# Patient Record
Sex: FEMALE | Race: ASIAN | NOT HISPANIC OR LATINO | Employment: FULL TIME | URBAN - METROPOLITAN AREA
[De-identification: names, ages, dates, MRNs, and addresses within clinical notes are randomized per-mention and may not be internally consistent; named-entity substitution may affect disease eponyms.]

---

## 2017-01-06 ENCOUNTER — TELEPHONE (OUTPATIENT)
Dept: PULMONOLOGY | Facility: CLINIC | Age: 47
End: 2017-01-06

## 2017-01-06 NOTE — TELEPHONE ENCOUNTER
Left vm for pt to return call to clinic in regards to med refill. Informing her she needed to make an appt since its been over a year since last seen in office.

## 2017-01-06 NOTE — TELEPHONE ENCOUNTER
----- Message from Octavia Devine sent at 1/6/2017  9:53 AM CST -----  Patient needs a refill on Montelukast 10mg called in to [facility] [phone] and the pharmacy has been faxing with no response.   Call her at [phone].                                     [email or web address]

## 2017-01-09 ENCOUNTER — TELEPHONE (OUTPATIENT)
Dept: PULMONOLOGY | Facility: CLINIC | Age: 47
End: 2017-01-09

## 2017-01-09 ENCOUNTER — OFFICE VISIT (OUTPATIENT)
Dept: PULMONOLOGY | Facility: CLINIC | Age: 47
End: 2017-01-09
Payer: COMMERCIAL

## 2017-01-09 ENCOUNTER — PROCEDURE VISIT (OUTPATIENT)
Dept: PULMONOLOGY | Facility: CLINIC | Age: 47
End: 2017-01-09
Payer: COMMERCIAL

## 2017-01-09 VITALS
RESPIRATION RATE: 18 BRPM | HEART RATE: 88 BPM | WEIGHT: 100 LBS | SYSTOLIC BLOOD PRESSURE: 94 MMHG | HEIGHT: 59 IN | OXYGEN SATURATION: 98 % | DIASTOLIC BLOOD PRESSURE: 64 MMHG | BODY MASS INDEX: 20.16 KG/M2

## 2017-01-09 DIAGNOSIS — R05.9 COUGH IN ADULT: ICD-10-CM

## 2017-01-09 DIAGNOSIS — J45.909 UNCOMPLICATED ASTHMA, UNSPECIFIED ASTHMA SEVERITY: ICD-10-CM

## 2017-01-09 DIAGNOSIS — J45.909 UNCOMPLICATED ASTHMA, UNSPECIFIED ASTHMA SEVERITY: Primary | ICD-10-CM

## 2017-01-09 LAB
PRE FEV1 FVC: 82.8 % (ref 75.77–102.92)
PRE FEV1: 1.28 L (ref 0.67–2.62)
PRE FVC: 1.54 L (ref 0.71–2.92)
PRE PEF: 2.75 L/S (ref 1.67–7.36)

## 2017-01-09 PROCEDURE — 1159F MED LIST DOCD IN RCRD: CPT | Mod: S$GLB,,, | Performed by: NURSE PRACTITIONER

## 2017-01-09 PROCEDURE — 94010 BREATHING CAPACITY TEST: CPT | Mod: S$GLB,,, | Performed by: INTERNAL MEDICINE

## 2017-01-09 PROCEDURE — 99999 PR PBB SHADOW E&M-EST. PATIENT-LVL III: CPT | Mod: PBBFAC,,, | Performed by: NURSE PRACTITIONER

## 2017-01-09 PROCEDURE — 99214 OFFICE O/P EST MOD 30 MIN: CPT | Mod: 25,S$GLB,, | Performed by: NURSE PRACTITIONER

## 2017-01-09 RX ORDER — MONTELUKAST SODIUM 10 MG/1
10 TABLET ORAL DAILY
Qty: 30 TABLET | Refills: 11 | Status: SHIPPED | OUTPATIENT
Start: 2017-01-09 | End: 2018-01-02 | Stop reason: SDUPTHER

## 2017-01-09 RX ORDER — ALBUTEROL SULFATE 90 UG/1
2 AEROSOL, METERED RESPIRATORY (INHALATION) EVERY 6 HOURS PRN
Qty: 18 G | Refills: 2 | Status: SHIPPED | OUTPATIENT
Start: 2017-01-09 | End: 2018-01-31 | Stop reason: SDUPTHER

## 2017-01-09 NOTE — PROGRESS NOTES
"Subjective:      Patient ID: Ernesto Aquino is a 46 y.o. female.    Chief Complaint: Asthma    HPI Comments: Patient presents to the office to follow for asthma.  Patient states asthma is typically controlled. No wheezing. She has not used her QVAR in over 6 months . Albuterol rarely now. Needs singulair refill for her night time cough   No fever, chills, hemoptysis. No pleuritic type chest pain.    Asthma   Her past medical history is significant for asthma.       Visit Vitals    BP 94/64    Pulse 88    Resp 18    Ht 4' 11" (1.499 m)    Wt 45.4 kg (100 lb)    LMP 12/30/2016 (Approximate)    SpO2 98%    BMI 20.2 kg/m2     Body mass index is 20.2 kg/(m^2).    Review of Systems   Constitutional: Negative.    HENT: Negative.    Respiratory: Negative.    Cardiovascular: Negative.    Musculoskeletal: Negative.    Gastrointestinal: Negative.    Neurological: Negative.    Psychiatric/Behavioral: Negative.      Objective:      Physical Exam   Constitutional: She is oriented to person, place, and time. She appears well-developed and well-nourished.   HENT:   Head: Normocephalic and atraumatic.   Mouth/Throat: Oropharynx is clear and moist.   Neck: Normal range of motion. Neck supple.   Cardiovascular: Normal rate and regular rhythm.  Exam reveals no gallop.    No murmur heard.  Pulmonary/Chest: Effort normal and breath sounds normal.   Abdominal: Soft. Bowel sounds are normal. She exhibits no mass. There is no tenderness.   Musculoskeletal: Normal range of motion. She exhibits no edema.   Neurological: She is alert and oriented to person, place, and time.   Skin: Skin is warm and dry.   Psychiatric: She has a normal mood and affect.     Personal Diagnostic Review  Review of spirometry.   Spirometry reviewed. FEV1 78 % of predicted.          Assessment:       1. Uncomplicated asthma, unspecified asthma severity        Outpatient Encounter Prescriptions as of 1/9/2017   Medication Sig Dispense Refill    albuterol " (PROAIR HFA) 90 mcg/actuation inhaler Inhale 2 puffs into the lungs every 6 (six) hours as needed for Wheezing. 18 g 2    loratadine (CLARITIN) 10 mg tablet Take 10 mg by mouth once daily.      montelukast (SINGULAIR) 10 mg tablet Take 1 tablet (10 mg total) by mouth once daily. 30 tablet 11    [DISCONTINUED] albuterol (PROAIR HFA) 90 mcg/actuation inhaler Inhale 1-2 puffs into the lungs every 6 (six) hours as needed for Wheezing.      [DISCONTINUED] montelukast (SINGULAIR) 10 mg tablet TAKE ONE TABLET BY MOUTH DAILY 30 tablet 11    [DISCONTINUED] beclomethasone (QVAR) 40 mcg/actuation Aero Inhale 2 puffs into the lungs 2 (two) times daily. 8.7 g 11     No facility-administered encounter medications on file as of 1/9/2017.      Orders Placed This Encounter   Procedures    Spirometry with/without bronchodilator     Standing Status:   Future     Standing Expiration Date:   1/9/2018     Plan:      Refill albuterol and singulair.   Follow up in 12 months with spirometry or call earlier if any problems

## 2017-02-14 ENCOUNTER — LAB VISIT (OUTPATIENT)
Dept: LAB | Facility: HOSPITAL | Age: 47
End: 2017-02-14
Attending: FAMILY MEDICINE
Payer: COMMERCIAL

## 2017-02-14 ENCOUNTER — OFFICE VISIT (OUTPATIENT)
Dept: FAMILY MEDICINE | Facility: CLINIC | Age: 47
End: 2017-02-14
Payer: COMMERCIAL

## 2017-02-14 VITALS
DIASTOLIC BLOOD PRESSURE: 70 MMHG | TEMPERATURE: 97 F | SYSTOLIC BLOOD PRESSURE: 104 MMHG | OXYGEN SATURATION: 99 % | WEIGHT: 96.13 LBS | HEIGHT: 59 IN | HEART RATE: 86 BPM | BODY MASS INDEX: 19.38 KG/M2 | RESPIRATION RATE: 18 BRPM

## 2017-02-14 DIAGNOSIS — Z00.00 ANNUAL PHYSICAL EXAM: Primary | ICD-10-CM

## 2017-02-14 DIAGNOSIS — J45.909 UNCOMPLICATED ASTHMA, UNSPECIFIED ASTHMA SEVERITY: Chronic | ICD-10-CM

## 2017-02-14 DIAGNOSIS — Z80.0 FAMILY HISTORY OF COLON CANCER: ICD-10-CM

## 2017-02-14 DIAGNOSIS — Z12.31 VISIT FOR SCREENING MAMMOGRAM: ICD-10-CM

## 2017-02-14 DIAGNOSIS — D50.9 IRON DEFICIENCY ANEMIA, UNSPECIFIED IRON DEFICIENCY ANEMIA TYPE: ICD-10-CM

## 2017-02-14 DIAGNOSIS — Z00.00 ANNUAL PHYSICAL EXAM: ICD-10-CM

## 2017-02-14 DIAGNOSIS — J30.9 ALLERGIC RHINITIS, UNSPECIFIED ALLERGIC RHINITIS TRIGGER, UNSPECIFIED RHINITIS SEASONALITY: ICD-10-CM

## 2017-02-14 LAB
AMORPH CRY UR QL COMP ASSIST: ABNORMAL
BACTERIA #/AREA URNS AUTO: ABNORMAL /HPF
BASOPHILS # BLD AUTO: 0.06 K/UL
BASOPHILS NFR BLD: 1 %
BILIRUB UR QL STRIP: NEGATIVE
CAOX CRY UR QL COMP ASSIST: ABNORMAL
CLARITY UR REFRACT.AUTO: ABNORMAL
COLOR UR AUTO: YELLOW
DIFFERENTIAL METHOD: NORMAL
EOSINOPHIL # BLD AUTO: 0.1 K/UL
EOSINOPHIL NFR BLD: 1.2 %
ERYTHROCYTE [DISTWIDTH] IN BLOOD BY AUTOMATED COUNT: 12.9 %
GLUCOSE UR QL STRIP: NEGATIVE
HCT VFR BLD AUTO: 39.5 %
HGB BLD-MCNC: 12.9 G/DL
HGB UR QL STRIP: NEGATIVE
KETONES UR QL STRIP: NEGATIVE
LEUKOCYTE ESTERASE UR QL STRIP: NEGATIVE
LYMPHOCYTES # BLD AUTO: 1.7 K/UL
LYMPHOCYTES NFR BLD: 28.5 %
MCH RBC QN AUTO: 29.3 PG
MCHC RBC AUTO-ENTMCNC: 32.7 %
MCV RBC AUTO: 90 FL
MICROSCOPIC COMMENT: ABNORMAL
MONOCYTES # BLD AUTO: 0.6 K/UL
MONOCYTES NFR BLD: 10.6 %
NEUTROPHILS # BLD AUTO: 3.5 K/UL
NEUTROPHILS NFR BLD: 58.4 %
NITRITE UR QL STRIP: NEGATIVE
PH UR STRIP: 6 [PH] (ref 5–8)
PLATELET # BLD AUTO: 266 K/UL
PMV BLD AUTO: 10.8 FL
PROT UR QL STRIP: NEGATIVE
RBC # BLD AUTO: 4.41 M/UL
RBC #/AREA URNS AUTO: 2 /HPF (ref 0–4)
SP GR UR STRIP: 1.02 (ref 1–1.03)
SQUAMOUS #/AREA URNS AUTO: 2 /HPF
URN SPEC COLLECT METH UR: ABNORMAL
UROBILINOGEN UR STRIP-ACNC: NEGATIVE EU/DL
WBC # BLD AUTO: 5.93 K/UL
WBC #/AREA URNS AUTO: 2 /HPF (ref 0–5)

## 2017-02-14 PROCEDURE — 80061 LIPID PANEL: CPT

## 2017-02-14 PROCEDURE — 84443 ASSAY THYROID STIM HORMONE: CPT

## 2017-02-14 PROCEDURE — 99396 PREV VISIT EST AGE 40-64: CPT | Mod: S$GLB,,, | Performed by: FAMILY MEDICINE

## 2017-02-14 PROCEDURE — 36415 COLL VENOUS BLD VENIPUNCTURE: CPT | Mod: PO

## 2017-02-14 PROCEDURE — 81001 URINALYSIS AUTO W/SCOPE: CPT

## 2017-02-14 PROCEDURE — 99999 PR PBB SHADOW E&M-EST. PATIENT-LVL III: CPT | Mod: PBBFAC,,, | Performed by: FAMILY MEDICINE

## 2017-02-14 PROCEDURE — 82728 ASSAY OF FERRITIN: CPT

## 2017-02-14 PROCEDURE — 85025 COMPLETE CBC W/AUTO DIFF WBC: CPT

## 2017-02-14 PROCEDURE — 80053 COMPREHEN METABOLIC PANEL: CPT

## 2017-02-14 NOTE — PROGRESS NOTES
HISTORY OF PRESENT ILLNESS: Ms. Aquino comes in today fasting and without problems for annual wellness exam. She reports no acute problems today.     END OF DECISION: She does not have a living will and is not sure about life support.     Current Outpatient Prescriptions   Medication Sig    albuterol (PROAIR HFA) 90 mcg/actuation inhaler Inhale 2 puffs into the lungs every 6 (six) hours as needed for Wheezing.    loratadine (CLARITIN) 10 mg tablet Take 10 mg by mouth once daily.    montelukast (SINGULAIR) 10 mg tablet Take 1 tablet (10 mg total) by mouth once daily.     SCREENINGS:   Cholesterol: February 23, 2016.   FFS/Colonoscopy: Never. She states she continues not to be ready for colonoscopy.  Mammogram: March 1, 2016  - okay.  GYN Exam: February 23, 2016 - okay. With Dr. Sharif, GYN, May 2013 - okay per patient. July 12, 2012 pap smear - okay.  Dexa Scan: Never.  Eye Exam: October 18, 2016 with Dr. Argelia Martin.   PPD: Negative in the past per patient.  Immunizations: Tdap - July 12, 2012.  Gardasil - N./A.  Zostavax - N./A.  Pneumovax - never. She declines.  Seasonal Flu - N./A. She declines.    ROS:  GENERAL: Denies fever, chills, fatigue or unusual weight change. Appetite normal. Weight 45.3 kg (99 lb 13.9 oz) at February 23, 2016 visit. Exercises and monitors diet.  SKIN: Denies rashes, itching, changes in mole, color or texture of skin or easy bruising.  HEAD: Denies headaches or recent head trauma.  EYES: Denies change in vision, pain, diplopia, redness or discharge.   EARS: Denies ear pain, discharge, vertigo or decreased hearing.  NOSE: Denies loss of smell, epistaxis or rhinitis.  MOUTH & THROAT: Denies hoarseness or change in voice. Denies excessive gum bleeding or mouth sores. Denies sore throat.  NODES: Denies swollen glands.  CHEST: Denies JAIMES, wheezing, cough, or sputum production. Saw NP Elizabeth Lejeune with pulmonary on January 9, 2017 for asthma surveillance with 1-year follow up  "with spirometry advised.   CARDIOVASCULAR: Denies chest pain, PND, orthopnea or reduced exercise tolerance. Denies palpitations.  ABDOMEN: Denies diarrhea, constipation, nausea, vomiting, abdominal pain, or blood in stool.  URINARY: Denies flank pain, dysuria or hematuria.  GENITOURINARY: Denies flank pain, dysuria, frequency or hematuria. No change in menses. Occasionally performs monthly breast self exams.  ENDOCRINE: Denies diabetes, thyroid, cholesterol problems.  HEME/LYMPH: Denies bleeding problems.   PERIPHERAL VASCULAR:Denies claudication or cyanosis.  MUSCULOSKELETAL: Denies joint stiffness, pain or swelling. Denies edema.  NEUROLOGIC: Denies history of seizures, tremors, paralysis, alteration of gait or coordination.  PSYCHIATRIC: Denies mood swings, depression, anxiety, homicidal or suicidal thoughts. Denies sleep problems.    PE:   VS:   Visit Vitals    /70 (BP Location: Right arm, Patient Position: Sitting, BP Method: Manual)    Pulse 86    Temp 97.3 °F (36.3 °C) (Tympanic)    Resp 18    Ht 4' 10.5" (1.486 m)    Wt 43.6 kg (96 lb 1.9 oz)    LMP 01/29/2017 (Exact Date)  Comment: Monthly    SpO2 99%    BMI 19.75 kg/m2     APPEARANCE: Well nourished, well developed female, pleasant, alert and oriented in no acute distress.   HEAD: Non tender. Full range of motion.  EYES: PERRL, conjunctiva pink, lids no edema.  EARS: External canals not patent. No swelling or redness. TM's not visualized due to impacted cerumen.   NOSE: Mucosa and turbinates pink, not swollen. No discharge. Non tender sinuses.  THROAT: No pharyngeal erythema or exudate. No stridor.   NECK: Supple, no mass, thyroid not enlarged.  NODES: No cervical, axillary or inguinal lymph node enlargement.  CHEST: Normal respiratory effort. Lungs clear to auscultation.  CARDIOVASCULAR: Normal S1, S2. No rubs, murmurs or gallops. PMI not displaced. No carotid bruit. Pedal pulses palpable bilaterally. No edema.  ABDOMEN: Bowel sounds " present. Not distended. Soft. No tenderness, masses or organomegaly.  BREAST EXAM: Symmetrical, no external lesions, no discharge, no masses palpated.  PELVIC EXAM: No external lesions noted, no discharge, cervix appears normal, bimanual exam showed no uterine abnormalities and no adnexal masses. Urethra and bladder intact.  RECTAL EXAM: Not examined.  MUSCULOSKELETAL: No joint deformities or stiffness. She is ambulatory without problems.  SKIN: No rashes or suspicious lesions, normal color and turgor.  NEUROLOGIC: Cranial Nerves: II-XII grossly intact. DTR's: Knees, Ankles 2+ and equal bilaterally. Gait & Posture: Normal gait and fine motion.  PSYCHIATRIC: Patient alert, oriented x 3. Mood/Affect normal without acute anxiety or depression noted. Judgment/insight good as she makes appropriate decisions on today's examination.       ASSESSMENT:    ICD-10-CM ICD-9-CM    1. Annual physical exam Z00.00 V70.0 CBC auto differential      TSH      Comprehensive metabolic panel      Lipid panel      Urinalysis      Ferritin   2. Uncomplicated asthma, unspecified asthma severity J45.909 493.90    3. Iron deficiency anemia, unspecified iron deficiency anemia type D50.9 280.9    4. Allergic rhinitis, unspecified allergic rhinitis trigger, unspecified rhinitis seasonality J30.9 477.9    5. Family history of colon cancer Z80.0 V16.0    6. Visit for screening mammogram Z12.31 V76.12 Mammo Digital Screening Bilat with CAD        PLAN:  1. Age-appropriate counseling-appropriate low-sodium, low-cholesterol diet and exercise daily, monthly breast self-exam, annual wellness examination.  2. Patient advised to correspond via My Chart for results.  3. Colonoscopy advised as patient has family history of colon cancer. However, she declines.   4. She also declines Pneumovax and flu shot today.   5. She declines cerumen removal.  6. Keep follow up with specialist.

## 2017-02-14 NOTE — MR AVS SNAPSHOT
[facility]  [address]  [place] [address]  Phone: [phone]                  Ernesto Aquino   2017 9:30 AM   Office Visit    Description:  Female : 1970   Provider:  Rea Kim MD   Department:  [facility]           Reason for Visit     Annual Exam           Diagnoses this Visit        Comments    Annual physical exam    -  Primary     Uncomplicated asthma, unspecified asthma severity         Iron deficiency anemia, unspecified iron deficiency anemia type         Allergic rhinitis, unspecified allergic rhinitis trigger, unspecified rhinitis seasonality         Family history of colon cancer         Visit for screening mammogram                To Do List           Future Appointments        Provider Department Dept Phone    2017 12:00 PM LABORATORY, [address] [facility]  [phone]    3/7/2017 8:15 AM [facility] MAMMO1-SCR [facility] [place] [phone]    10/24/2017 9:45 AM Argelia Martin OD [facility] - Ophthalmology [phone]      Goals (5 Years of Data)     None      Follow-Up and Disposition     Return if symptoms worsen or fail to improve.      [facility]     [facility] Nurse Care Line -  Assistance  Registered nurses in the [facility] provide clinical advisement, health education, appointment booking, and other advisory services.  Call for this free service at [phone].             Medications           Message regarding Medications     Verify the changes and/or additions to your medication regime listed below are the same as discussed with your clinician today.  If any of these changes or additions are incorrect, please notify your healthcare provider.             Verify that the below list of medications is an accurate representation of the medications you are currently taking.  If none reported, the list may be blank. If incorrect, please contact  "your healthcare provider. Carry this list with you in case of emergency.           Current Medications     albuterol (PROAIR HFA) 90 mcg/actuation inhaler Inhale 2 puffs into the lungs every 6 (six) hours as needed for Wheezing.    loratadine (CLARITIN) 10 mg tablet Take 10 mg by mouth once daily.    montelukast (SINGULAIR) 10 mg tablet Take 1 tablet (10 mg total) by mouth once daily.           Clinical Reference Information           Your Vitals Were     BP Pulse Temp Resp Height    104/70 (BP Location: Right arm, Patient Position: Sitting, BP Method: Manual) 86 97.3 °F (36.3 °C) (Tympanic) 18 4' 10.5" (1.486 m)    Weight Last Period SpO2 BMI    43.6 kg (96 lb 1.9 oz) 01/29/2017 (Exact Date) 99% 19.75 kg/m2      Blood Pressure          Most Recent Value    BP  104/70      Allergies as of 2/14/2017     No Known Allergies      Immunizations Administered on Date of Encounter - 2/14/2017     None      Orders Placed During Today's Visit      Normal Orders This Visit    Urinalysis     Future Labs/Procedures Expected by Expires    CBC auto differential  2/14/2017 4/15/2018    Comprehensive metabolic panel  2/14/2017 4/15/2018    Ferritin  2/14/2017 4/15/2018    Lipid panel  2/14/2017 4/15/2018    Mammo Digital Screening Bilat with CAD  2/14/2017 4/16/2018    TSH  2/14/2017 4/15/2018      Instructions    Please correspond with Dr. Kim via My Chart for your results.     Thanks.       Language Assistance Services     ATTENTION: Language assistance services are available, free of charge. Please call [phone].      ATENCIÓN: Si habla español, tiene a waters disposición servicios gratuitos de asistencia lingüística. Llame al [phone].     CHÚ Ý: N?u b?n nói Ti?ng Vi?t, có các d?ch v? h? tr? ngôn ng? mi?n phí dành cho b?n. G?i s? [phone].         [facility] complies with applicable Federal civil rights laws and does not discriminate on the basis of race, color, national origin, age, " disability, or sex.

## 2017-02-15 LAB
ALBUMIN SERPL BCP-MCNC: 3.8 G/DL
ALP SERPL-CCNC: 66 U/L
ALT SERPL W/O P-5'-P-CCNC: 13 U/L
ANION GAP SERPL CALC-SCNC: 9 MMOL/L
AST SERPL-CCNC: 17 U/L
BILIRUB SERPL-MCNC: 0.4 MG/DL
BUN SERPL-MCNC: 12 MG/DL
CALCIUM SERPL-MCNC: 8.9 MG/DL
CHLORIDE SERPL-SCNC: 106 MMOL/L
CHOLEST/HDLC SERPL: 2.8 {RATIO}
CO2 SERPL-SCNC: 23 MMOL/L
CREAT SERPL-MCNC: 0.6 MG/DL
EST. GFR  (AFRICAN AMERICAN): >60 ML/MIN/1.73 M^2
EST. GFR  (NON AFRICAN AMERICAN): >60 ML/MIN/1.73 M^2
FERRITIN SERPL-MCNC: 32 NG/ML
GLUCOSE SERPL-MCNC: 84 MG/DL
HDL/CHOLESTEROL RATIO: 36 %
HDLC SERPL-MCNC: 197 MG/DL
HDLC SERPL-MCNC: 71 MG/DL
LDLC SERPL CALC-MCNC: 114.6 MG/DL
NONHDLC SERPL-MCNC: 126 MG/DL
POTASSIUM SERPL-SCNC: 4.1 MMOL/L
PROT SERPL-MCNC: 8 G/DL
SODIUM SERPL-SCNC: 138 MMOL/L
TRIGL SERPL-MCNC: 57 MG/DL
TSH SERPL DL<=0.005 MIU/L-ACNC: 1.26 UIU/ML

## 2017-03-07 ENCOUNTER — HOSPITAL ENCOUNTER (OUTPATIENT)
Dept: RADIOLOGY | Facility: HOSPITAL | Age: 47
Discharge: HOME | End: 2017-03-07
Attending: FAMILY MEDICINE
Payer: COMMERCIAL

## 2017-03-07 DIAGNOSIS — Z12.31 VISIT FOR SCREENING MAMMOGRAM: ICD-10-CM

## 2017-03-07 PROCEDURE — 77067 SCR MAMMO BI INCL CAD: CPT | Mod: 26,,, | Performed by: RADIOLOGY

## 2017-03-07 PROCEDURE — 77067 SCR MAMMO BI INCL CAD: CPT | Mod: TC

## 2017-10-24 ENCOUNTER — OFFICE VISIT (OUTPATIENT)
Dept: OPHTHALMOLOGY | Facility: CLINIC | Age: 47
End: 2017-10-24
Payer: COMMERCIAL

## 2017-10-24 DIAGNOSIS — H52.13 MYOPIA OF BOTH EYES: ICD-10-CM

## 2017-10-24 DIAGNOSIS — Z83.511 FAMILY HISTORY OF GLAUCOMA: Primary | ICD-10-CM

## 2017-10-24 PROCEDURE — 92014 COMPRE OPH EXAM EST PT 1/>: CPT | Mod: S$GLB,,, | Performed by: OPTOMETRIST

## 2017-10-24 PROCEDURE — 92015 DETERMINE REFRACTIVE STATE: CPT | Mod: S$GLB,,, | Performed by: OPTOMETRIST

## 2017-10-24 PROCEDURE — 99999 PR PBB SHADOW E&M-EST. PATIENT-LVL II: CPT | Mod: PBBFAC,,, | Performed by: OPTOMETRIST

## 2017-10-24 NOTE — PROGRESS NOTES
HPI     HPI    Any vision changes since last exam: no  Eye pain: none  Other ocular symptoms: none    Do you wear currently wear glasses or contacts? Glasses    Interested in contacts today? no    Do you plan on getting new glasses today? no    Last edited by Florentino Orellana on 10/24/2017 10:05 AM. (History)            Assessment /Plan     For exam results, see Encounter Report.    Family history of glaucoma    Myopia of both eyes      Glaucoma screening and fundus exam normal.  Stable refractive error.  Updated glasses prescription.  Patient will continue with single vision distance prescription and read without glasses.  Return to clinic 1 yr.

## 2018-01-02 RX ORDER — MONTELUKAST SODIUM 10 MG/1
TABLET ORAL
Qty: 30 TABLET | Refills: 11 | Status: SHIPPED | OUTPATIENT
Start: 2018-01-02 | End: 2019-01-07 | Stop reason: SDUPTHER

## 2018-01-08 ENCOUNTER — PROCEDURE VISIT (OUTPATIENT)
Dept: PULMONOLOGY | Facility: CLINIC | Age: 48
End: 2018-01-08
Payer: COMMERCIAL

## 2018-01-08 ENCOUNTER — OFFICE VISIT (OUTPATIENT)
Dept: PULMONOLOGY | Facility: CLINIC | Age: 48
End: 2018-01-08
Payer: COMMERCIAL

## 2018-01-08 VITALS
RESPIRATION RATE: 16 BRPM | WEIGHT: 87 LBS | SYSTOLIC BLOOD PRESSURE: 100 MMHG | OXYGEN SATURATION: 99 % | DIASTOLIC BLOOD PRESSURE: 60 MMHG | BODY MASS INDEX: 17.54 KG/M2 | HEIGHT: 59 IN

## 2018-01-08 DIAGNOSIS — J45.909 UNCOMPLICATED ASTHMA: ICD-10-CM

## 2018-01-08 DIAGNOSIS — R05.9 COUGH IN ADULT: ICD-10-CM

## 2018-01-08 DIAGNOSIS — J45.20 MILD INTERMITTENT ASTHMA WITHOUT COMPLICATION: Primary | ICD-10-CM

## 2018-01-08 PROCEDURE — 99999 PR PBB SHADOW E&M-EST. PATIENT-LVL III: CPT | Mod: PBBFAC,,, | Performed by: NURSE PRACTITIONER

## 2018-01-08 PROCEDURE — 99214 OFFICE O/P EST MOD 30 MIN: CPT | Mod: 25,S$GLB,, | Performed by: NURSE PRACTITIONER

## 2018-01-08 PROCEDURE — 94060 EVALUATION OF WHEEZING: CPT | Mod: S$GLB,,, | Performed by: INTERNAL MEDICINE

## 2018-01-08 NOTE — PROGRESS NOTES
"Subjective:      Patient ID: Ernesto Aquino is a 47 y.o. female.    Chief Complaint: Asthma    Patient presents to the office today for evaluation of asthma.  This is annual follow-up.  Her asthma was controlled with Singulair alone until recently.  Over the last month increased asthma symptoms and increased use of rescue inhaler.  Increased chest tightness and shortness of breath.  No fever, chills, hemoptysis        /60   Resp 16   Ht 4' 11" (1.499 m)   Wt 39.5 kg (87 lb)   SpO2 99%   BMI 17.57 kg/m²   Body mass index is 17.57 kg/m².    Review of Systems   Constitutional: Negative.    HENT: Negative.    Respiratory: Positive for shortness of breath and wheezing.    Cardiovascular: Negative.    Musculoskeletal: Negative.    Gastrointestinal: Negative.    Neurological: Negative.    Psychiatric/Behavioral: Negative.      Objective:      Physical Exam   Constitutional: She is oriented to person, place, and time. She appears well-developed and well-nourished.   HENT:   Head: Normocephalic and atraumatic.   Mouth/Throat: Oropharynx is clear and moist.   Neck: Normal range of motion. Neck supple.   Cardiovascular: Normal rate and regular rhythm.  Exam reveals no gallop.    No murmur heard.  Pulmonary/Chest: Effort normal and breath sounds normal.   Abdominal: Soft. She exhibits no mass. There is no tenderness.   Musculoskeletal: Normal range of motion. She exhibits no edema.   Neurological: She is alert and oriented to person, place, and time.   Skin: Skin is warm and dry.   Psychiatric: She has a normal mood and affect.     Personal Diagnostic Review  Spirometry reviewed. FEV1 78 % of predicted.          Assessment:       1. Mild intermittent asthma without complication    2. Cough in adult        Outpatient Encounter Prescriptions as of 1/8/2018   Medication Sig Dispense Refill    albuterol (PROAIR HFA) 90 mcg/actuation inhaler Inhale 2 puffs into the lungs every 6 (six) hours as needed for Wheezing. 18 g " 2    loratadine (CLARITIN) 10 mg tablet Take 10 mg by mouth once daily.      montelukast (SINGULAIR) 10 mg tablet TAKE 1 TABLET BY MOUTH ONCE DAILY 30 tablet 11    beclomethasone (QVAR) 40 mcg/actuation Aero Inhale 2 puffs into the lungs 2 (two) times daily. Controller 120 each 11     No facility-administered encounter medications on file as of 1/8/2018.      No orders of the defined types were placed in this encounter.    Plan:   Add QVAR.  Continue Singulair and albuterol.  Call if symptoms do not resolve

## 2018-01-09 LAB
POST FEF 25 75: 2.81 L/S (ref 2.15–3.2)
POST FET 100: 7.34 S
POST FEV1 FVC: 87 %
POST FEV1: 1.93 L (ref 2.22–2.71)
POST FIF 50: 3.67 L/S
POST FVC: 2.21 L (ref 2.75–3.34)
POST PEF: 3.98 L/S (ref 5.42–6.88)
PRE FEF 25 75: 2.73 L/S (ref 2.15–3.2)
PRE FET 100: 7.42 S
PRE FEV1 FVC: 89 %
PRE FEV1: 1.81 L (ref 2.22–2.71)
PRE FIF 50: 2.56 L/S
PRE FVC: 2.04 L (ref 2.75–3.34)
PRE PEF: 3.85 L/S (ref 5.42–6.88)
PREDICTED FEV1 FVC: 81.25 % (ref 76.35–86.14)
PREDICTED FEV1: 2.46 L (ref 2.22–2.71)
PREDICTED FVC: 3.05 L (ref 2.75–3.34)
PROVOCATION PROTOCOL: ABNORMAL

## 2018-01-31 RX ORDER — ALBUTEROL SULFATE 90 UG/1
AEROSOL, METERED RESPIRATORY (INHALATION)
Qty: 8.5 G | Refills: 0 | Status: SHIPPED | OUTPATIENT
Start: 2018-01-31 | End: 2018-03-03 | Stop reason: SDUPTHER

## 2018-02-07 ENCOUNTER — TELEPHONE (OUTPATIENT)
Dept: PULMONOLOGY | Facility: CLINIC | Age: 48
End: 2018-02-07

## 2018-02-27 ENCOUNTER — LAB VISIT (OUTPATIENT)
Dept: LAB | Facility: HOSPITAL | Age: 48
End: 2018-02-27
Attending: FAMILY MEDICINE
Payer: COMMERCIAL

## 2018-02-27 ENCOUNTER — OFFICE VISIT (OUTPATIENT)
Dept: FAMILY MEDICINE | Facility: CLINIC | Age: 48
End: 2018-02-27
Payer: COMMERCIAL

## 2018-02-27 VITALS
OXYGEN SATURATION: 97 % | BODY MASS INDEX: 16.8 KG/M2 | HEIGHT: 59 IN | SYSTOLIC BLOOD PRESSURE: 100 MMHG | DIASTOLIC BLOOD PRESSURE: 70 MMHG | HEART RATE: 86 BPM | TEMPERATURE: 98 F | RESPIRATION RATE: 18 BRPM | WEIGHT: 83.31 LBS

## 2018-02-27 DIAGNOSIS — D50.9 IRON DEFICIENCY ANEMIA, UNSPECIFIED IRON DEFICIENCY ANEMIA TYPE: ICD-10-CM

## 2018-02-27 DIAGNOSIS — J45.909 UNCOMPLICATED ASTHMA, UNSPECIFIED ASTHMA SEVERITY, UNSPECIFIED WHETHER PERSISTENT: Chronic | ICD-10-CM

## 2018-02-27 DIAGNOSIS — Z00.00 ANNUAL PHYSICAL EXAM: ICD-10-CM

## 2018-02-27 DIAGNOSIS — Z80.0 FAMILY HISTORY OF COLON CANCER: ICD-10-CM

## 2018-02-27 LAB
ALBUMIN SERPL BCP-MCNC: 3.7 G/DL
ALP SERPL-CCNC: 62 U/L
ALT SERPL W/O P-5'-P-CCNC: 12 U/L
ANION GAP SERPL CALC-SCNC: 8 MMOL/L
AST SERPL-CCNC: 14 U/L
BASOPHILS # BLD AUTO: 0.06 K/UL
BASOPHILS NFR BLD: 1 %
BILIRUB SERPL-MCNC: 0.5 MG/DL
BILIRUB UR QL STRIP: NEGATIVE
BUN SERPL-MCNC: 9 MG/DL
CALCIUM SERPL-MCNC: 8.9 MG/DL
CHLORIDE SERPL-SCNC: 106 MMOL/L
CHOLEST SERPL-MCNC: 165 MG/DL
CHOLEST/HDLC SERPL: 2.6 {RATIO}
CLARITY UR REFRACT.AUTO: CLEAR
CO2 SERPL-SCNC: 26 MMOL/L
COLOR UR AUTO: YELLOW
CREAT SERPL-MCNC: 0.6 MG/DL
DIFFERENTIAL METHOD: ABNORMAL
EOSINOPHIL # BLD AUTO: 0.1 K/UL
EOSINOPHIL NFR BLD: 0.8 %
ERYTHROCYTE [DISTWIDTH] IN BLOOD BY AUTOMATED COUNT: 13.8 %
EST. GFR  (AFRICAN AMERICAN): >60 ML/MIN/1.73 M^2
EST. GFR  (NON AFRICAN AMERICAN): >60 ML/MIN/1.73 M^2
FERRITIN SERPL-MCNC: 18 NG/ML
GLUCOSE SERPL-MCNC: 78 MG/DL
GLUCOSE UR QL STRIP: NEGATIVE
HCT VFR BLD AUTO: 38.3 %
HDLC SERPL-MCNC: 63 MG/DL
HDLC SERPL: 38.2 %
HGB BLD-MCNC: 12 G/DL
HGB UR QL STRIP: NEGATIVE
IMM GRANULOCYTES # BLD AUTO: 0.02 K/UL
IMM GRANULOCYTES NFR BLD AUTO: 0.3 %
KETONES UR QL STRIP: NEGATIVE
LDLC SERPL CALC-MCNC: 94 MG/DL
LEUKOCYTE ESTERASE UR QL STRIP: NEGATIVE
LYMPHOCYTES # BLD AUTO: 1.6 K/UL
LYMPHOCYTES NFR BLD: 27.1 %
MCH RBC QN AUTO: 28.2 PG
MCHC RBC AUTO-ENTMCNC: 31.3 G/DL
MCV RBC AUTO: 90 FL
MONOCYTES # BLD AUTO: 0.6 K/UL
MONOCYTES NFR BLD: 9.6 %
NEUTROPHILS # BLD AUTO: 3.7 K/UL
NEUTROPHILS NFR BLD: 61.2 %
NITRITE UR QL STRIP: NEGATIVE
NONHDLC SERPL-MCNC: 102 MG/DL
NRBC BLD-RTO: 0 /100 WBC
PH UR STRIP: 6 [PH] (ref 5–8)
PLATELET # BLD AUTO: 249 K/UL
PMV BLD AUTO: 11.8 FL
POTASSIUM SERPL-SCNC: 3.9 MMOL/L
PROT SERPL-MCNC: 7.8 G/DL
PROT UR QL STRIP: NEGATIVE
RBC # BLD AUTO: 4.26 M/UL
SODIUM SERPL-SCNC: 140 MMOL/L
SP GR UR STRIP: 1.02 (ref 1–1.03)
TRIGL SERPL-MCNC: 40 MG/DL
TSH SERPL DL<=0.005 MIU/L-ACNC: 1.01 UIU/ML
URN SPEC COLLECT METH UR: ABNORMAL
UROBILINOGEN UR STRIP-ACNC: ABNORMAL EU/DL
WBC # BLD AUTO: 6.05 K/UL

## 2018-02-27 PROCEDURE — 99396 PREV VISIT EST AGE 40-64: CPT | Mod: S$GLB,,, | Performed by: FAMILY MEDICINE

## 2018-02-27 PROCEDURE — 80053 COMPREHEN METABOLIC PANEL: CPT

## 2018-02-27 PROCEDURE — 81003 URINALYSIS AUTO W/O SCOPE: CPT

## 2018-02-27 PROCEDURE — 80061 LIPID PANEL: CPT

## 2018-02-27 PROCEDURE — 85025 COMPLETE CBC W/AUTO DIFF WBC: CPT

## 2018-02-27 PROCEDURE — 36415 COLL VENOUS BLD VENIPUNCTURE: CPT | Mod: PO

## 2018-02-27 PROCEDURE — 82728 ASSAY OF FERRITIN: CPT

## 2018-02-27 PROCEDURE — 84443 ASSAY THYROID STIM HORMONE: CPT

## 2018-02-27 PROCEDURE — 99999 PR PBB SHADOW E&M-EST. PATIENT-LVL III: CPT | Mod: PBBFAC,,, | Performed by: FAMILY MEDICINE

## 2018-02-27 NOTE — PROGRESS NOTES
HISTORY OF PRESENT ILLNESS: Ms. Aquino comes in today fasting and without problems for annual wellness exam. She reports no acute problems today.     END OF DECISION: She does not have a living will and is not sure about life support.     Current Outpatient Prescriptions   Medication Sig    beclomethasone dipropionate (QVAR REDIHALER) 40 mcg/actuation HFAB Inhale 2 puffs into the lungs 2 (two) times daily.    loratadine (CLARITIN) 10 mg tablet Take 10 mg by mouth once daily.    montelukast (SINGULAIR) 10 mg tablet TAKE 1 TABLET BY MOUTH ONCE DAILY    PROAIR HFA 90 mcg/actuation inhaler INHALE 2 PUFFS BY MOUTH INTO THE LUNGS EVERY 6 HOURS AS NEEDED FOR WHEEZING     SCREENINGS:   Cholesterol: February 14, 2017.   FFS/Colonoscopy: Never. She states she continues not to be ready for colonoscopy and declines FIT-Kit.  Mammogram: March 7, 2017 - okay. She desires to skip this year.  GYN Exam: February 23, 2016 pap smear - okay. Pap smear due 2019.   Dexa Scan: Never.  Eye Exam: October 24, 2017 with Dr. Argelia Martin. She wears glasses.  PPD: Negative in the past per patient.  Immunizations: Tdap - July 12, 2012.  Gardasil - N./A.  Zostavax - N./A.  Pneumovax - never. She declines.  Seasonal Flu - N./A. She declines.    ROS:  GENERAL: Denies fever, chills, fatigue or unusual weight change. Appetite normal. Weight 43.6 kg (96 lb 1.9 oz) at February 14, 2017 visit. Exercises and monitors diet.  SKIN: Denies rashes, itching, changes in mole, color or texture of skin or easy bruising.  HEAD: Denies headaches or recent head trauma.  EYES: Denies change in vision, pain, diplopia, redness or discharge.   EARS: Denies ear pain, discharge, vertigo or decreased hearing.  NOSE: Denies loss of smell, epistaxis or rhinitis.  MOUTH & THROAT: Denies hoarseness or change in voice. Denies excessive gum bleeding or mouth sores. Denies sore throat.  NODES: Denies swollen glands.  CHEST: Denies JAIMES, wheezing, cough, or sputum  "production. Saw NP Elizabeth Lejeune with pulmonary on January 8, 2018 for asthma surveillance with Qvar added to Albuterol, Singulair with 1-year follow up with spirometry advised.   CARDIOVASCULAR: Denies chest pain, PND, orthopnea or reduced exercise tolerance. Denies palpitations.  ABDOMEN: Denies diarrhea, constipation, nausea, vomiting, abdominal pain, or blood in stool.  URINARY: Denies flank pain, dysuria or hematuria.  GENITOURINARY: Denies flank pain, dysuria, frequency or hematuria. No change in menses. Occasionally performs monthly breast self exams. Interested in conception.  ENDOCRINE: Denies diabetes, thyroid, cholesterol problems.  HEME/LYMPH: Denies bleeding problems.   PERIPHERAL VASCULAR:Denies claudication or cyanosis.  MUSCULOSKELETAL: Denies joint stiffness, pain or swelling. Denies edema.  NEUROLOGIC: Denies history of seizures, tremors, paralysis, alteration of gait or coordination.  PSYCHIATRIC: Denies mood swings, depression, anxiety, homicidal or suicidal thoughts. Denies sleep problems.    PE:   VS: /70 (BP Location: Right arm, Patient Position: Sitting, BP Method: Medium (Manual))   Pulse 86   Temp 98.4 °F (36.9 °C) (Oral)   Resp 18   Ht 4' 11" (1.499 m)   Wt 37.8 kg (83 lb 5.3 oz)   LMP 02/21/2018 (Exact Date) Comment: Monthly  SpO2 97%   BMI 16.83 kg/m²   APPEARANCE: Well nourished, well developed female, pleasant, alert and oriented in no acute distress.   HEAD: Non tender. Full range of motion.  EYES: PERRL, conjunctiva pink, lids no edema.  EARS: External canals not patent. No swelling or redness. TM's not visualized due to impacted cerumen.   NOSE: Mucosa and turbinates pink, not swollen. No discharge. Non tender sinuses.  THROAT: No pharyngeal erythema or exudate. No stridor.   NECK: Supple, no mass, thyroid not enlarged.  NODES: No cervical, axillary or inguinal lymph node enlargement.  CHEST: Normal respiratory effort. Lungs clear to auscultation.  CARDIOVASCULAR: " Normal S1, S2. No rubs, murmurs or gallops. PMI not displaced. No carotid bruit. Pedal pulses palpable bilaterally. No edema.  ABDOMEN: Bowel sounds present. Not distended. Soft. No tenderness, masses or organomegaly.  BREAST EXAM: Symmetrical, no external lesions, no discharge, no masses palpated.  PELVIC EXAM: No external lesions noted, no discharge, cervix appears normal, bimanual exam showed no uterine abnormalities and no adnexal masses. Urethra and bladder intact.  RECTAL EXAM: Not examined.  MUSCULOSKELETAL: No joint deformities or stiffness. She is ambulatory without problems.  SKIN: No rashes or suspicious lesions, normal color and turgor.  NEUROLOGIC: Cranial Nerves: II-XII grossly intact. DTR's: Knees, Ankles 2+ and equal bilaterally. Gait & Posture: Normal gait and fine motion.  PSYCHIATRIC: Patient alert, oriented x 3. Mood/Affect normal without acute anxiety or depression noted. Judgment/insight good as she makes appropriate decisions on today's examination.     ASSESSMENT:    ICD-10-CM ICD-9-CM    1. Annual physical exam Z00.00 V70.0 CBC auto differential      TSH      Urinalysis      Comprehensive metabolic panel      Lipid panel      Ferritin   2. Uncomplicated asthma, unspecified asthma severity, unspecified whether persistent J45.909 493.90    3. Iron deficiency anemia, unspecified iron deficiency anemia type D50.9 280.9    4. Family history of colon cancer Z80.0 V16.0      PLAN:  1. Age-appropriate counseling-appropriate low-sodium, low-cholesterol, low carbohydrate diet and exercise daily, monthly breast self exam, annual wellness examination.   2. Patient advised to correspond via My Chart for results.  3. Colonoscopy advised as patient has family history of colon cancer. However, she declines.   4. She also declines Pneumovax and flu shot today.   5. She declines cerumen removal.  6. Keep follow up with specialists.   7. Follow-up in about 1 year (around 2/26/2019) for physical.

## 2018-03-05 RX ORDER — ALBUTEROL SULFATE 90 UG/1
AEROSOL, METERED RESPIRATORY (INHALATION)
Qty: 8.5 G | Refills: 0 | Status: SHIPPED | OUTPATIENT
Start: 2018-03-05 | End: 2019-01-04 | Stop reason: SDUPTHER

## 2018-03-22 ENCOUNTER — OFFICE VISIT (OUTPATIENT)
Dept: OBSTETRICS AND GYNECOLOGY | Facility: CLINIC | Age: 48
End: 2018-03-22
Payer: COMMERCIAL

## 2018-03-22 VITALS
HEIGHT: 59 IN | DIASTOLIC BLOOD PRESSURE: 71 MMHG | BODY MASS INDEX: 16.44 KG/M2 | SYSTOLIC BLOOD PRESSURE: 118 MMHG | WEIGHT: 81.56 LBS

## 2018-03-22 DIAGNOSIS — Z01.419 ENCOUNTER FOR GYNECOLOGICAL EXAMINATION WITHOUT ABNORMAL FINDING: Primary | ICD-10-CM

## 2018-03-22 PROCEDURE — 99999 PR PBB SHADOW E&M-EST. PATIENT-LVL III: CPT | Mod: PBBFAC,,, | Performed by: NURSE PRACTITIONER

## 2018-03-22 PROCEDURE — 99386 PREV VISIT NEW AGE 40-64: CPT | Mod: S$GLB,,, | Performed by: NURSE PRACTITIONER

## 2018-03-22 NOTE — PROGRESS NOTES
"CC: Well woman exam    Ernesto Aquino is a 47 y.o. female  presents for well woman exam.  LMP: Patient's last menstrual period was 2018..  Pap with Dr. Kim in  negative.   Pt wants pregnancy.  Cycle started today, Last was 18 so pt requested upt - it was negative.  She realize risk with her age and pregnancy.  She has been actively trying with no success.       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     Family History   Problem Relation Age of Onset    Diabetes Mother     Hypertension Mother     Cancer Mother      Lung cancer (nonsmoker)    Stroke Mother     Diabetes Father     Heart disease Father      CABG x 4    Hypertension Father     Cataracts Father     Cancer Brother 49     Colon cancer    Hypertension Sister     Asthma Sister     Asthma Brother     Asthma Brother     Glaucoma Paternal Grandmother      OB History      Para Term  AB Living    0              SAB TAB Ectopic Multiple Live Births                       /71   Ht 4' 11" (1.499 m)   Wt 37 kg (81 lb 9.1 oz)   LMP 2018 Comment: Monthly  BMI 16.48 kg/m²       ROS:  GENERAL: Denies weight gain or weight loss. Feeling well overall.   SKIN: Denies rash or lesions.   HEAD: Denies head " injury or headache.   NODES: Denies enlarged lymph nodes.   CHEST: Denies chest pain or shortness of breath.   CARDIOVASCULAR: Denies palpitations or left sided chest pain.   ABDOMEN: No abdominal pain, constipation, diarrhea, nausea, vomiting or rectal bleeding.   URINARY: No frequency, dysuria, hematuria, or burning on urination.  REPRODUCTIVE: See HPI.   BREASTS: The patient performs breast self-examination and denies pain, lumps, or nipple discharge.   HEMATOLOGIC: No easy bruisability or excessive bleeding.   MUSCULOSKELETAL: Denies joint pain or swelling.   NEUROLOGIC: Denies syncope or weakness.   PSYCHIATRIC: Denies depression, anxiety or mood swings.    PHYSICAL EXAM:  APPEARANCE: Well nourished, well developed, in no acute distress.  AFFECT: WNL, alert and oriented x 3  SKIN: No acne or hirsutism  NECK: Neck symmetric without masses or thyromegaly  NODES: No inguinal, cervical, axillary, or femoral lymph node enlargement  CHEST: Good respiratory effect  ABDOMEN: Soft.  No tenderness or masses.  No hepatosplenomegaly.  No hernias.  BREASTS: Symmetrical, no skin changes or visible lesions.  No palpable masses, nipple discharge bilaterally.  PELVIC: Normal external genitalia without lesions.  Normal hair distribution.  Adequate perineal body, normal urethral meatus.  Vagina moist and well rugated without lesions or discharge.  Cervix pink, without lesions, discharge or tenderness.  No significant cystocele or rectocele.  Bimanual exam shows uterus to be normal size, regular, mobile and nontender.  Adnexa without masses or tenderness.    EXTREMITIES: No edema.  Physical Exam    1. Encounter for gynecological examination without abnormal finding  POCT Urine Pregnancy    AND PLAN:    Patient was counseled today on A.C.S. Pap guidelines and recommendations for yearly pelvic exams, mammograms and monthly self breast exams; to see her PCP for other health maintenance.     Declines mmg this year  upt negative today  in office   recommend start prenatal vitamin  Will see gyn MD back to discuss fertility at age 47

## 2018-04-09 ENCOUNTER — OFFICE VISIT (OUTPATIENT)
Dept: OBSTETRICS AND GYNECOLOGY | Facility: CLINIC | Age: 48
End: 2018-04-09
Payer: COMMERCIAL

## 2018-04-09 VITALS
DIASTOLIC BLOOD PRESSURE: 68 MMHG | SYSTOLIC BLOOD PRESSURE: 102 MMHG | HEIGHT: 60 IN | WEIGHT: 81 LBS | BODY MASS INDEX: 15.9 KG/M2

## 2018-04-09 DIAGNOSIS — Z31.69 ENCOUNTER FOR PRECONCEPTION CONSULTATION: Primary | ICD-10-CM

## 2018-04-09 PROCEDURE — 99999 PR PBB SHADOW E&M-EST. PATIENT-LVL II: CPT | Mod: PBBFAC,,, | Performed by: OBSTETRICS & GYNECOLOGY

## 2018-04-09 PROCEDURE — 99213 OFFICE O/P EST LOW 20 MIN: CPT | Mod: S$GLB,,, | Performed by: OBSTETRICS & GYNECOLOGY

## 2018-04-09 NOTE — PROGRESS NOTES
CHIEF COMPLAINT:   Chief Complaint   Patient presents with    Infertility       HISTORY OF PRESENT ILLNESS    Ernesto Aquino 47 y.o. G0  Wants to get pregnant. Never had a pregnancy. She just had intercourse for the first time this year w current partner. She and her partner decided they want to try for a baby. He has a an 10yo and 7yo children.   She came for advise. She is not interested in IRVIN or IVF.       Menarche about 13yo, now menses reg qmo, not too heavy, max 4d. No dysmennorhea.  Patient's last menstrual period was 03/22/2018.     Pap 2016 nl   .    HISTORY  Patient Active Problem List   Diagnosis    Asthma    Family history of colon cancer    QUINTEN (iron deficiency anemia)       Past Medical History:   Diagnosis Date    Allergic rhinitis, cause unspecified     Asthma     QUINTEN (iron deficiency anemia) 3/2015       Past Surgical History:   Procedure Laterality Date    None         Family History   Problem Relation Age of Onset    Diabetes Mother     Hypertension Mother     Cancer Mother      Lung cancer (nonsmoker)    Stroke Mother     Diabetes Father     Heart disease Father      CABG x 4    Hypertension Father     Cataracts Father     Cancer Brother 49     Colon cancer    Hypertension Sister     Asthma Sister     Asthma Brother     Asthma Brother     Glaucoma Paternal Grandmother        Social History     Social History    Marital status: Single     Spouse name: N/A    Number of children: 0    Years of education: N/A     Occupational History    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       Current Outpatient Prescriptions   Medication Sig  Dispense Refill    beclomethasone dipropionate (QVAR REDIHALER) 40 mcg/actuation HFAB Inhale 2 puffs into the lungs 2 (two) times daily. 10.6 g 11    loratadine (CLARITIN) 10 mg tablet Take 10 mg by mouth once daily.      montelukast (SINGULAIR) 10 mg tablet TAKE 1 TABLET BY MOUTH ONCE DAILY 30 tablet 11    PROAIR HFA 90 mcg/actuation inhaler INHALE 2 PUFFS BY MOUTH INTO THE LUNGS EVERY 6 HOURS AS NEEDED FOR WHEEZING 8.5 g 0     No current facility-administered medications for this visit.        Review of patient's allergies indicates:  No Known Allergies        PHYSICAL EXAM     Vitals:    04/09/18 1132   BP: 102/68       PAIN SCALE: 0/10 None    ROS:  GENERAL: No fever, chills, fatigability or weight loss.  ABDOMEN: Appetite fine. No weight loss. Denies diarrhea, abdominal pain, hematemesis or blood in stool.  URINARY: No flank pain, dysuria or hematuria.  REPRODUCTIVE: No abnormal vaginal bleeding.   BREASTS: Breasts symmetric, nontender and no lumps detected.    PE:   APPEARANCE: Well nourished, well developed, in no acute distress.  Exam nl per 3/2018 exam w Martín    DIAGNOSIS:   1. Pregnancy counseling            MEDICATIONS PRESCRIBED:   pnv    COUNSELING:  Patient was counseled today on fertility in 40s. Reviewed risks of infertility, pregnancy loss, chromosomal fetal abnormalitites, pregnancy comorbidities at 40. Pt verbalized understanding and stated they just wanted to try and if it happens they will be happy w whatever outcome. If no spontaneous pregnancy, she declines IRVIN referal.    Patient was given preconceptional counseling: medical history and medications were reviewed. Recommendations were made for lifestyle, diet and exercise improvements.  Recommended daily use of folic acid. Educated on and offered pt available testing for cystic fibrosis mutations (declined) and rubella immunity.      Patient was counseled on above diagnoses (with over 50% of the time spent on education) during her 15,      minute visit.           FOLLOW-UP: With Joon for annuals.

## 2018-10-12 ENCOUNTER — TELEPHONE (OUTPATIENT)
Dept: OPHTHALMOLOGY | Facility: CLINIC | Age: 48
End: 2018-10-12

## 2018-10-12 NOTE — TELEPHONE ENCOUNTER
----- Message from Olga Lidia Rizzo sent at 10/12/2018  4:13 PM CDT -----  Contact: pt  The pt request a call to schedule a appt, the pt can be reached at [phone]///[id number]

## 2018-10-23 ENCOUNTER — OFFICE VISIT (OUTPATIENT)
Dept: OPHTHALMOLOGY | Facility: CLINIC | Age: 48
End: 2018-10-23
Payer: COMMERCIAL

## 2018-10-23 DIAGNOSIS — Z83.511 FAMILY HISTORY OF GLAUCOMA: Primary | ICD-10-CM

## 2018-10-23 DIAGNOSIS — H52.13 MYOPIA OF BOTH EYES: ICD-10-CM

## 2018-10-23 PROCEDURE — 99999 PR PBB SHADOW E&M-EST. PATIENT-LVL II: CPT | Mod: PBBFAC,,, | Performed by: OPTOMETRIST

## 2018-10-23 PROCEDURE — 92015 DETERMINE REFRACTIVE STATE: CPT | Mod: S$GLB,,, | Performed by: OPTOMETRIST

## 2018-10-23 PROCEDURE — 92014 COMPRE OPH EXAM EST PT 1/>: CPT | Mod: S$GLB,,, | Performed by: OPTOMETRIST

## 2018-10-23 NOTE — PROGRESS NOTES
HPI     Eye Exam      Additional comments: Yearly              Comments     Last seen by [facility] [place] on 10/24/17 for yearly eye exam  No noticeable changes in vision since last eye exam  Wears SVL glasses full-time last updated 1 year ago  No other complaints  No drops    1. Fx glaucoma-grandmother          Last edited by Jessie Malin, PCT on 10/23/2018  8:50 AM.   (History)            Assessment /Plan     For exam results, see Encounter Report.    Family history of glaucoma    Myopia of both eyes      Glaucoma screening and fundus exam normal.  Updated glasses prescription.  Return to clinic 1 yr.

## 2018-12-14 ENCOUNTER — TELEPHONE (OUTPATIENT)
Dept: PULMONOLOGY | Facility: CLINIC | Age: 48
End: 2018-12-14

## 2018-12-14 NOTE — TELEPHONE ENCOUNTER
Patient call this am and reschedule appointment due to personal reasons, appointment reschedule for 01/04/18   Starting @ 820 with juan diego and F/u appointment @ 900. Patient agreed with date and time.

## 2019-01-04 ENCOUNTER — OFFICE VISIT (OUTPATIENT)
Dept: PULMONOLOGY | Facility: CLINIC | Age: 49
End: 2019-01-04
Payer: COMMERCIAL

## 2019-01-04 ENCOUNTER — CLINICAL SUPPORT (OUTPATIENT)
Dept: PULMONOLOGY | Facility: CLINIC | Age: 49
End: 2019-01-04
Payer: COMMERCIAL

## 2019-01-04 VITALS
BODY MASS INDEX: 16.2 KG/M2 | WEIGHT: 82.5 LBS | DIASTOLIC BLOOD PRESSURE: 72 MMHG | HEIGHT: 60 IN | OXYGEN SATURATION: 99 % | RESPIRATION RATE: 16 BRPM | SYSTOLIC BLOOD PRESSURE: 100 MMHG | HEART RATE: 69 BPM

## 2019-01-04 DIAGNOSIS — J45.20 MILD INTERMITTENT ASTHMA WITHOUT COMPLICATION: ICD-10-CM

## 2019-01-04 DIAGNOSIS — J45.909 ASTHMA, UNSPECIFIED ASTHMA SEVERITY, UNSPECIFIED WHETHER COMPLICATED, UNSPECIFIED WHETHER PERSISTENT: Primary | ICD-10-CM

## 2019-01-04 LAB
BRPFT: NORMAL
FEF 25 75 CHG: 9 %
FEF 25 75 LLN: 1.48
FEF 25 75 POST REF: 109.8 %
FEF 25 75 PRE REF: 100.8 %
FEF 25 75 REF: 2.48
FET100 CHG: -4.5 %
FEV1 CHG: 0.6 %
FEV1 FVC CHG: 1.8 %
FEV1 FVC LLN: 74
FEV1 FVC POST REF: 103.4 %
FEV1 FVC PRE REF: 101.6 %
FEV1 FVC REF: 84
FEV1 LLN: 1.64
FEV1 POST REF: 90.5 %
FEV1 PRE REF: 89.9 %
FEV1 REF: 2.13
FEV6 CHG: -1.2 %
FEV6 POST: 2.22 L
FEV6 PRE: 2.24 L
FVC CHG: -1.1 %
FVC LLN: 1.98
FVC POST REF: 87.5 %
FVC PRE REF: 88.4 %
FVC REF: 2.54
MVV LLN: 74
MVV REF: 87
PEF CHG: 16.3 %
POST FEF 25 75: 2.73 L/S (ref 1.48–3.49)
POST FET 100: 7.07 SEC
POST FEV1 FVC: 87.02 % (ref 74.48–93.79)
POST FEV1: 1.93 L (ref 1.64–2.62)
POST FVC: 2.22 L (ref 1.98–3.1)
POST PEF: 5.08 L/S
PRE FEF 25 75: 2.5 L/S (ref 1.48–3.49)
PRE FET 100: 7.41 SEC
PRE FEV1 FVC: 85.52 % (ref 74.48–93.79)
PRE FEV1: 1.92 L (ref 1.64–2.62)
PRE FVC: 2.24 L (ref 1.98–3.1)
PRE PEF: 4.37 L/S

## 2019-01-04 PROCEDURE — 99999 PR PBB SHADOW E&M-EST. PATIENT-LVL III: ICD-10-PCS | Mod: PBBFAC,,, | Performed by: NURSE PRACTITIONER

## 2019-01-04 PROCEDURE — 3008F BODY MASS INDEX DOCD: CPT | Mod: CPTII,S$GLB,, | Performed by: NURSE PRACTITIONER

## 2019-01-04 PROCEDURE — 99999 PR PBB SHADOW E&M-EST. PATIENT-LVL III: CPT | Mod: PBBFAC,,, | Performed by: NURSE PRACTITIONER

## 2019-01-04 PROCEDURE — 94060 EVALUATION OF WHEEZING: CPT | Mod: S$GLB,,, | Performed by: INTERNAL MEDICINE

## 2019-01-04 PROCEDURE — 99214 PR OFFICE/OUTPT VISIT, EST, LEVL IV, 30-39 MIN: ICD-10-PCS | Mod: 25,S$GLB,, | Performed by: NURSE PRACTITIONER

## 2019-01-04 PROCEDURE — 99214 OFFICE O/P EST MOD 30 MIN: CPT | Mod: 25,S$GLB,, | Performed by: NURSE PRACTITIONER

## 2019-01-04 PROCEDURE — 94060 PR EVAL OF BRONCHOSPASM: ICD-10-PCS | Mod: S$GLB,,, | Performed by: INTERNAL MEDICINE

## 2019-01-04 PROCEDURE — 3008F PR BODY MASS INDEX (BMI) DOCUMENTED: ICD-10-PCS | Mod: CPTII,S$GLB,, | Performed by: NURSE PRACTITIONER

## 2019-01-04 RX ORDER — ALBUTEROL SULFATE 90 UG/1
AEROSOL, METERED RESPIRATORY (INHALATION)
Qty: 8.5 G | Refills: 0 | Status: SHIPPED | OUTPATIENT
Start: 2019-01-04 | End: 2019-02-02 | Stop reason: SDUPTHER

## 2019-01-07 RX ORDER — MONTELUKAST SODIUM 10 MG/1
TABLET ORAL
Qty: 30 TABLET | Refills: 11 | Status: SHIPPED | OUTPATIENT
Start: 2019-01-07 | End: 2021-08-26 | Stop reason: SDUPTHER

## 2019-01-07 NOTE — PROGRESS NOTES
Subjective:      Patient ID: Ernesto Aquino is a 48 y.o. female.    Chief Complaint: Asthma    HPI  Patient presents to office for evaluation of asthma.  She takes Singulair daily.  Last year we had a QVAR to her regimen due to increased symptoms.  She did not understand to refill monthly.  She takes albuterol at least once a day.    Patient Active Problem List   Diagnosis    Asthma    Family history of colon cancer    QUINTEN (iron deficiency anemia)       /72   Pulse 69   Resp 16   Ht 5' (1.524 m)   Wt 37.4 kg (82 lb 8 oz)   SpO2 99%   BMI 16.11 kg/m²   Body mass index is 16.11 kg/m².    Review of Systems   Constitutional: Negative.    HENT: Negative.    Respiratory: Positive for dyspnea on extertion.    Cardiovascular: Negative.    Musculoskeletal: Negative.    Gastrointestinal: Negative.    Neurological: Negative.    Psychiatric/Behavioral: Negative.      Objective:      Physical Exam   Constitutional: She is oriented to person, place, and time. She appears well-developed and well-nourished.   HENT:   Head: Normocephalic and atraumatic.   Mouth/Throat: Oropharynx is clear and moist.   Neck: Normal range of motion. Neck supple.   Cardiovascular: Normal rate and regular rhythm.   Pulmonary/Chest: Effort normal and breath sounds normal.   Abdominal: Soft.   Musculoskeletal: Normal range of motion. She exhibits no edema.   Neurological: She is alert and oriented to person, place, and time.   Skin: Skin is warm and dry.   Psychiatric: She has a normal mood and affect.     Personal Diagnostic Review    Results for orders placed or performed in visit on 01/04/19   Spirometry with/without bronchodilator   Result Value Ref Range    Interpretation       Normal spirometry. Compared to previous test of 01/08/18: No significant change.     Post FEV1 1.93 1.64 - 2.62 L    Post FEV1 FVC 87.02 74.48 - 93.79 %    Post FVC 2.22 1.98 - 3.10 L    Post PEF 5.08 L/s    FEV6 POST 2.22 L    Post FEF 25 75 2.73 1.48 - 3.49  L/s    Post  7.07 sec    Pre FEV1 1.92 1.64 - 2.62 L    Pre FEV1 FVC 85.52 74.48 - 93.79 %    Pre FVC 2.24 1.98 - 3.10 L    Pre PEF 4.37 L/s    FEV6 PRE 2.24 L    Pre FEF 25 75 2.50 1.48 - 3.49 L/s    Pre  7.41 sec    FVC Ref 2.54     FVC LLN 1.98     FVC Pre Ref 88.4 %    FVC Post Ref 87.5 %    FVC Chg -1.1 %    FEV1 Ref 2.13     FEV1 LLN 1.64     FEV1 Pre Ref 89.9 %    FEV1 Post Ref 90.5 %    FEV1 Chg 0.6 %    FEV1 FVC Ref 84     FEV1 FVC LLN 74     FEV1 FVC Pre Ref 101.6 %    FEV1 FVC Post Ref 103.4 %    FEV1 FVC Chg 1.8 %    FEV6 Chg -1.2 %    FEF 25 75 Ref 2.48     FEF 25 75 LLN 1.48     FEF 25 75 Pre Ref 100.8 %    FEF 25 75 Post Ref 109.8 %    FEF 25 75 Chg 9.0 %    PEF Chg 16.3 %    RBK131 Chg -4.5 %    MVV Ref 87     MVV LLN 74            Assessment:       1. Asthma, unspecified asthma severity, unspecified whether complicated, unspecified whether persistent        Outpatient Encounter Medications as of 1/4/2019   Medication Sig Dispense Refill    albuterol (PROAIR HFA) 90 mcg/actuation inhaler INHALE 2 PUFFS BY MOUTH INTO THE LUNGS EVERY 6 HOURS AS NEEDED FOR WHEEZING 8.5 g 0    beclomethasone dipropionate (QVAR REDIHALER) 40 mcg/actuation HFAB Inhale 2 puffs into the lungs 2 (two) times daily. 10.6 g 11    loratadine (CLARITIN) 10 mg tablet Take 10 mg by mouth once daily.      [DISCONTINUED] beclomethasone dipropionate (QVAR REDIHALER) 40 mcg/actuation HFAB Inhale 2 puffs into the lungs 2 (two) times daily. 10.6 g 11    [DISCONTINUED] montelukast (SINGULAIR) 10 mg tablet TAKE 1 TABLET BY MOUTH ONCE DAILY 30 tablet 11    [DISCONTINUED] PROAIR HFA 90 mcg/actuation inhaler INHALE 2 PUFFS BY MOUTH INTO THE LUNGS EVERY 6 HOURS AS NEEDED FOR WHEEZING 8.5 g 0     Facility-Administered Encounter Medications as of 1/4/2019   Medication Dose Route Frequency Provider Last Rate Last Dose    prenatal vit#96 ferrous fum-fa 27 mg iron-800 mcg oral tab  1 tablet Oral Daily Roxann Cruz MD          Orders Placed This Encounter   Procedures    Spirometry with/without bronchodilator     Standing Status:   Future     Standing Expiration Date:   1/4/2020     Plan:   Start back on QVAR. Educated this is a twice daily medication with refills.   Albuterol if needed.   Continue Singulair.

## 2019-01-07 NOTE — TELEPHONE ENCOUNTER
Attempted to contact patient and notify patient to medication Montelukast 10mg daily was called in and need to continue medication daily, no answer LVM

## 2019-01-11 ENCOUNTER — TELEPHONE (OUTPATIENT)
Dept: FAMILY MEDICINE | Facility: CLINIC | Age: 49
End: 2019-01-11

## 2019-01-11 ENCOUNTER — OFFICE VISIT (OUTPATIENT)
Dept: FAMILY MEDICINE | Facility: CLINIC | Age: 49
End: 2019-01-11
Payer: COMMERCIAL

## 2019-01-11 VITALS
TEMPERATURE: 100 F | BODY MASS INDEX: 15.69 KG/M2 | HEIGHT: 60 IN | OXYGEN SATURATION: 99 % | HEART RATE: 75 BPM | WEIGHT: 79.94 LBS | DIASTOLIC BLOOD PRESSURE: 68 MMHG | SYSTOLIC BLOOD PRESSURE: 112 MMHG

## 2019-01-11 DIAGNOSIS — J45.30 MILD PERSISTENT ASTHMA WITHOUT COMPLICATION: Chronic | ICD-10-CM

## 2019-01-11 DIAGNOSIS — M54.50 ACUTE RIGHT-SIDED LOW BACK PAIN WITHOUT SCIATICA: Primary | ICD-10-CM

## 2019-01-11 PROCEDURE — 99999 PR PBB SHADOW E&M-EST. PATIENT-LVL III: CPT | Mod: PBBFAC,,, | Performed by: FAMILY MEDICINE

## 2019-01-11 PROCEDURE — 3008F BODY MASS INDEX DOCD: CPT | Mod: CPTII,S$GLB,, | Performed by: FAMILY MEDICINE

## 2019-01-11 PROCEDURE — 3008F PR BODY MASS INDEX (BMI) DOCUMENTED: ICD-10-PCS | Mod: CPTII,S$GLB,, | Performed by: FAMILY MEDICINE

## 2019-01-11 PROCEDURE — 99999 PR PBB SHADOW E&M-EST. PATIENT-LVL III: ICD-10-PCS | Mod: PBBFAC,,, | Performed by: FAMILY MEDICINE

## 2019-01-11 PROCEDURE — 99214 OFFICE O/P EST MOD 30 MIN: CPT | Mod: S$GLB,,, | Performed by: FAMILY MEDICINE

## 2019-01-11 PROCEDURE — 99214 PR OFFICE/OUTPT VISIT, EST, LEVL IV, 30-39 MIN: ICD-10-PCS | Mod: S$GLB,,, | Performed by: FAMILY MEDICINE

## 2019-01-11 RX ORDER — IBUPROFEN 600 MG/1
600 TABLET ORAL EVERY 8 HOURS PRN
Qty: 20 TABLET | Refills: 0 | Status: SHIPPED | OUTPATIENT
Start: 2019-01-11 | End: 2021-08-17

## 2019-01-11 NOTE — PROGRESS NOTES
Subjective:       Patient ID: Ernesto Aquino is a 48 y.o. female.    Chief Complaint: Abdominal Pain (on right side )    48-year-old female patient with Patient Active Problem List:     Asthma     Family history of colon cancer     QUINTEN (iron deficiency anemia)  Reports that she started to have right lower back pain for the past 4 days radiating to the right lower abdomen, denies any trouble with the urine or changes in bowel, does not recall any injury or trauma or lifting heavy weights, feels chills nausea vomiting  Has tried taking taking over-the-counter Aleve with no response  Asthma has been stable  Reports pain up to 6 to 7/10, dull achy pain      Review of Systems   Constitutional: Negative for chills, fatigue and fever.   Eyes: Negative for visual disturbance.   Respiratory: Negative for shortness of breath.    Cardiovascular: Negative for chest pain and leg swelling.   Gastrointestinal: Positive for abdominal pain. Negative for constipation, nausea and vomiting.   Genitourinary: Positive for flank pain. Negative for dysuria, frequency and urgency.   Musculoskeletal: Positive for back pain and myalgias.   Skin: Negative for rash.   Neurological: Negative for light-headedness and headaches.   Psychiatric/Behavioral: Negative for sleep disturbance.         /68   Pulse 75   Temp 100 °F (37.8 °C) (Oral)   Ht 5' (1.524 m)   Wt 36.3 kg (79 lb 14.7 oz)   LMP 12/14/2018 (Exact Date)   SpO2 99%   BMI 15.61 kg/m²   Objective:      Physical Exam   Constitutional: She is oriented to person, place, and time. She appears well-developed and well-nourished.   HENT:   Head: Normocephalic and atraumatic.   Mouth/Throat: Oropharynx is clear and moist.   Cardiovascular: Normal rate, regular rhythm and normal heart sounds.   No murmur heard.  Pulmonary/Chest: Effort normal and breath sounds normal. She has no wheezes.   Abdominal: Soft. Bowel sounds are normal. She exhibits no mass. There is tenderness. There is no  guarding.   Positive for tenderness in the right lower quadrant on palpation today   Musculoskeletal: She exhibits tenderness. She exhibits no edema.   Positive for right paraspinal lumbar muscle tenderness noted on exam today   Neurological: She is alert and oriented to person, place, and time.   Skin: Skin is warm and dry. No rash noted. No erythema.   Psychiatric: She has a normal mood and affect.         Assessment:       1. Acute right-sided low back pain without sciatica    2. Mild persistent asthma without complication        Plan:   Acute right-sided low back pain without sciatica  -     CBC auto differential; Future; Expected date: 01/11/2019  -     Basic metabolic panel; Future; Expected date: 01/11/2019  -     X-Ray Abdomen Flat And Erect; Future; Expected date: 01/11/2019  -     Urinalysis; Future; Expected date: 01/11/2019  -     ibuprofen (ADVIL,MOTRIN) 600 MG tablet; Take 1 tablet (600 mg total) by mouth every 8 (eight) hours as needed for Pain.  Dispense: 20 tablet; Refill: 0  Could be musculoskeletal but will get further evaluation to rule out acute infectious etiology  Patient was advised to take ibuprofen as prescribed  Warm compresses recommended  Drink adequate fluids  Vital stable today    Mild persistent asthma without complication-stable

## 2019-01-14 ENCOUNTER — HOSPITAL ENCOUNTER (OUTPATIENT)
Dept: RADIOLOGY | Facility: HOSPITAL | Age: 49
Discharge: HOME | End: 2019-01-14
Attending: FAMILY MEDICINE
Payer: COMMERCIAL

## 2019-01-14 DIAGNOSIS — M54.50 ACUTE RIGHT-SIDED LOW BACK PAIN WITHOUT SCIATICA: ICD-10-CM

## 2019-01-14 PROCEDURE — 74019 RADEX ABDOMEN 2 VIEWS: CPT | Mod: TC,FY,PO

## 2019-01-14 PROCEDURE — 74019 RADEX ABDOMEN 2 VIEWS: CPT | Mod: 26,,, | Performed by: RADIOLOGY

## 2019-01-14 PROCEDURE — 74019 XR ABDOMEN FLAT AND ERECT: ICD-10-PCS | Mod: 26,,, | Performed by: RADIOLOGY

## 2019-01-15 DIAGNOSIS — N39.0 URINARY TRACT INFECTION WITHOUT HEMATURIA, SITE UNSPECIFIED: Primary | ICD-10-CM

## 2019-01-15 RX ORDER — SULFAMETHOXAZOLE AND TRIMETHOPRIM 800; 160 MG/1; MG/1
1 TABLET ORAL 2 TIMES DAILY
Qty: 6 TABLET | Refills: 0 | Status: SHIPPED | OUTPATIENT
Start: 2019-01-15 | End: 2019-01-18

## 2019-02-04 RX ORDER — ALBUTEROL SULFATE 90 UG/1
AEROSOL, METERED RESPIRATORY (INHALATION)
Qty: 8.5 G | Refills: 0 | Status: SHIPPED | OUTPATIENT
Start: 2019-02-04 | End: 2021-08-27 | Stop reason: SDUPTHER

## 2019-03-15 DIAGNOSIS — Z12.39 BREAST CANCER SCREENING: ICD-10-CM

## 2020-09-28 ENCOUNTER — TELEPHONE (OUTPATIENT)
Dept: OPHTHALMOLOGY | Facility: CLINIC | Age: 50
End: 2020-09-28

## 2020-09-28 NOTE — TELEPHONE ENCOUNTER
Called patient informed patient that glasses prescription is  ant that she need a eye exam to update glasses prescription. Patient refuse appointment states she move to [facility]. Mail  prescription to patient.

## 2020-09-28 NOTE — TELEPHONE ENCOUNTER
----- Message from Rea Simmons sent at 9/28/2020  1:09 PM CDT -----  Regarding: prescriptiopn  Contact: patient  Patient was last seen by Dr Argelia Martin in 2018 and is requesting a copy of her prescription to get more glasses, please call her  back at [phone]

## 2020-10-06 ENCOUNTER — PATIENT MESSAGE (OUTPATIENT)
Dept: ADMINISTRATIVE | Facility: HOSPITAL | Age: 50
End: 2020-10-06

## 2021-04-28 ENCOUNTER — PATIENT MESSAGE (OUTPATIENT)
Dept: RESEARCH | Facility: HOSPITAL | Age: 51
End: 2021-04-28

## 2021-07-22 ENCOUNTER — TELEPHONE (OUTPATIENT)
Dept: FAMILY MEDICINE | Facility: CLINIC | Age: 51
End: 2021-07-22

## 2021-07-22 ENCOUNTER — OFFICE VISIT (OUTPATIENT)
Dept: URGENT CARE | Facility: CLINIC | Age: 51
End: 2021-07-22
Payer: COMMERCIAL

## 2021-07-22 VITALS
RESPIRATION RATE: 18 BRPM | HEIGHT: 59 IN | OXYGEN SATURATION: 97 % | HEART RATE: 80 BPM | WEIGHT: 90 LBS | BODY MASS INDEX: 18.14 KG/M2 | TEMPERATURE: 99 F | SYSTOLIC BLOOD PRESSURE: 115 MMHG | DIASTOLIC BLOOD PRESSURE: 74 MMHG

## 2021-07-22 DIAGNOSIS — U07.1 COVID-19: Primary | ICD-10-CM

## 2021-07-22 LAB
CTP QC/QA: YES
SARS-COV-2 RDRP RESP QL NAA+PROBE: POSITIVE

## 2021-07-22 PROCEDURE — 99213 PR OFFICE/OUTPT VISIT, EST, LEVL III, 20-29 MIN: ICD-10-PCS | Mod: S$GLB,,, | Performed by: PHYSICIAN ASSISTANT

## 2021-07-22 PROCEDURE — 1126F PR PAIN SEVERITY QUANTIFIED, NO PAIN PRESENT: ICD-10-PCS | Mod: CPTII,S$GLB,, | Performed by: PHYSICIAN ASSISTANT

## 2021-07-22 PROCEDURE — U0002 COVID-19 LAB TEST NON-CDC: HCPCS | Mod: QW,CR,S$GLB, | Performed by: PHYSICIAN ASSISTANT

## 2021-07-22 PROCEDURE — U0002: ICD-10-PCS | Mod: QW,CR,S$GLB, | Performed by: PHYSICIAN ASSISTANT

## 2021-07-22 PROCEDURE — 99213 OFFICE O/P EST LOW 20 MIN: CPT | Mod: S$GLB,,, | Performed by: PHYSICIAN ASSISTANT

## 2021-07-22 PROCEDURE — 3008F BODY MASS INDEX DOCD: CPT | Mod: CPTII,S$GLB,, | Performed by: PHYSICIAN ASSISTANT

## 2021-07-22 PROCEDURE — 1126F AMNT PAIN NOTED NONE PRSNT: CPT | Mod: CPTII,S$GLB,, | Performed by: PHYSICIAN ASSISTANT

## 2021-07-22 PROCEDURE — 3008F PR BODY MASS INDEX (BMI) DOCUMENTED: ICD-10-PCS | Mod: CPTII,S$GLB,, | Performed by: PHYSICIAN ASSISTANT

## 2021-07-23 ENCOUNTER — TELEPHONE (OUTPATIENT)
Dept: FAMILY MEDICINE | Facility: CLINIC | Age: 51
End: 2021-07-23

## 2021-07-25 ENCOUNTER — TELEPHONE (OUTPATIENT)
Dept: URGENT CARE | Facility: CLINIC | Age: 51
End: 2021-07-25

## 2021-07-29 ENCOUNTER — NURSE TRIAGE (OUTPATIENT)
Dept: ADMINISTRATIVE | Facility: CLINIC | Age: 51
End: 2021-07-29

## 2021-08-17 ENCOUNTER — OFFICE VISIT (OUTPATIENT)
Dept: FAMILY MEDICINE | Facility: CLINIC | Age: 51
End: 2021-08-17
Payer: COMMERCIAL

## 2021-08-17 VITALS
WEIGHT: 83.13 LBS | HEIGHT: 59 IN | OXYGEN SATURATION: 100 % | TEMPERATURE: 97 F | BODY MASS INDEX: 16.76 KG/M2 | SYSTOLIC BLOOD PRESSURE: 136 MMHG | HEART RATE: 79 BPM | DIASTOLIC BLOOD PRESSURE: 80 MMHG

## 2021-08-17 DIAGNOSIS — Z00.00 ANNUAL PHYSICAL EXAM: Primary | ICD-10-CM

## 2021-08-17 DIAGNOSIS — J45.30 MILD PERSISTENT ASTHMA WITHOUT COMPLICATION: ICD-10-CM

## 2021-08-17 DIAGNOSIS — Z12.31 OTHER SCREENING MAMMOGRAM: ICD-10-CM

## 2021-08-17 DIAGNOSIS — D50.9 IRON DEFICIENCY ANEMIA, UNSPECIFIED IRON DEFICIENCY ANEMIA TYPE: ICD-10-CM

## 2021-08-17 LAB
BACTERIA #/AREA URNS AUTO: ABNORMAL /HPF
BILIRUB UR QL STRIP: NEGATIVE
CLARITY UR REFRACT.AUTO: ABNORMAL
COLOR UR AUTO: YELLOW
GLUCOSE UR QL STRIP: NEGATIVE
HGB UR QL STRIP: NEGATIVE
KETONES UR QL STRIP: NEGATIVE
LEUKOCYTE ESTERASE UR QL STRIP: ABNORMAL
MICROSCOPIC COMMENT: ABNORMAL
NITRITE UR QL STRIP: POSITIVE
PH UR STRIP: 5 [PH] (ref 5–8)
PROT UR QL STRIP: NEGATIVE
SP GR UR STRIP: 1.02 (ref 1–1.03)
URN SPEC COLLECT METH UR: ABNORMAL
WBC #/AREA URNS AUTO: 12 /HPF (ref 0–5)

## 2021-08-17 PROCEDURE — 99396 PR PREVENTIVE VISIT,EST,40-64: ICD-10-PCS | Mod: S$GLB,,, | Performed by: FAMILY MEDICINE

## 2021-08-17 PROCEDURE — 99999 PR PBB SHADOW E&M-EST. PATIENT-LVL V: CPT | Mod: PBBFAC,,, | Performed by: FAMILY MEDICINE

## 2021-08-17 PROCEDURE — 3075F PR MOST RECENT SYSTOLIC BLOOD PRESS GE 130-139MM HG: ICD-10-PCS | Mod: CPTII,S$GLB,, | Performed by: FAMILY MEDICINE

## 2021-08-17 PROCEDURE — 88175 CYTOPATH C/V AUTO FLUID REDO: CPT | Performed by: FAMILY MEDICINE

## 2021-08-17 PROCEDURE — 99396 PREV VISIT EST AGE 40-64: CPT | Mod: S$GLB,,, | Performed by: FAMILY MEDICINE

## 2021-08-17 PROCEDURE — 3008F BODY MASS INDEX DOCD: CPT | Mod: CPTII,S$GLB,, | Performed by: FAMILY MEDICINE

## 2021-08-17 PROCEDURE — 87624 HPV HI-RISK TYP POOLED RSLT: CPT | Performed by: FAMILY MEDICINE

## 2021-08-17 PROCEDURE — 99999 PR PBB SHADOW E&M-EST. PATIENT-LVL V: ICD-10-PCS | Mod: PBBFAC,,, | Performed by: FAMILY MEDICINE

## 2021-08-17 PROCEDURE — 3079F PR MOST RECENT DIASTOLIC BLOOD PRESSURE 80-89 MM HG: ICD-10-PCS | Mod: CPTII,S$GLB,, | Performed by: FAMILY MEDICINE

## 2021-08-17 PROCEDURE — 3008F PR BODY MASS INDEX (BMI) DOCUMENTED: ICD-10-PCS | Mod: CPTII,S$GLB,, | Performed by: FAMILY MEDICINE

## 2021-08-17 PROCEDURE — 81001 URINALYSIS AUTO W/SCOPE: CPT | Performed by: FAMILY MEDICINE

## 2021-08-17 PROCEDURE — 3075F SYST BP GE 130 - 139MM HG: CPT | Mod: CPTII,S$GLB,, | Performed by: FAMILY MEDICINE

## 2021-08-17 PROCEDURE — 3079F DIAST BP 80-89 MM HG: CPT | Mod: CPTII,S$GLB,, | Performed by: FAMILY MEDICINE

## 2021-08-20 ENCOUNTER — TELEPHONE (OUTPATIENT)
Dept: PULMONOLOGY | Facility: CLINIC | Age: 51
End: 2021-08-20

## 2021-08-24 LAB
FINAL PATHOLOGIC DIAGNOSIS: NORMAL
HPV HR 12 DNA SPEC QL NAA+PROBE: POSITIVE
HPV16 AG SPEC QL: NEGATIVE
HPV18 DNA SPEC QL NAA+PROBE: NEGATIVE
Lab: NORMAL

## 2021-08-26 ENCOUNTER — TELEPHONE (OUTPATIENT)
Dept: FAMILY MEDICINE | Facility: CLINIC | Age: 51
End: 2021-08-26

## 2021-08-26 ENCOUNTER — OFFICE VISIT (OUTPATIENT)
Dept: PULMONOLOGY | Facility: CLINIC | Age: 51
End: 2021-08-26
Payer: COMMERCIAL

## 2021-08-26 ENCOUNTER — HOSPITAL ENCOUNTER (OUTPATIENT)
Dept: RADIOLOGY | Facility: HOSPITAL | Age: 51
Discharge: HOME | End: 2021-08-26
Attending: NURSE PRACTITIONER
Payer: COMMERCIAL

## 2021-08-26 VITALS
BODY MASS INDEX: 16.71 KG/M2 | WEIGHT: 82.88 LBS | OXYGEN SATURATION: 98 % | DIASTOLIC BLOOD PRESSURE: 68 MMHG | SYSTOLIC BLOOD PRESSURE: 100 MMHG | HEART RATE: 82 BPM | HEIGHT: 59 IN | RESPIRATION RATE: 16 BRPM

## 2021-08-26 DIAGNOSIS — J45.30 MILD PERSISTENT ASTHMA WITHOUT COMPLICATION: ICD-10-CM

## 2021-08-26 DIAGNOSIS — J45.30 MILD PERSISTENT ASTHMA WITHOUT COMPLICATION: Primary | ICD-10-CM

## 2021-08-26 DIAGNOSIS — J30.89 NON-SEASONAL ALLERGIC RHINITIS, UNSPECIFIED TRIGGER: ICD-10-CM

## 2021-08-26 DIAGNOSIS — Z86.16 HISTORY OF COVID-19: ICD-10-CM

## 2021-08-26 DIAGNOSIS — Z00.00 ANNUAL PHYSICAL EXAM: Primary | ICD-10-CM

## 2021-08-26 PROCEDURE — 71046 X-RAY EXAM CHEST 2 VIEWS: CPT | Mod: 26,,, | Performed by: RADIOLOGY

## 2021-08-26 PROCEDURE — 3008F BODY MASS INDEX DOCD: CPT | Mod: CPTII,S$GLB,, | Performed by: NURSE PRACTITIONER

## 2021-08-26 PROCEDURE — 3074F SYST BP LT 130 MM HG: CPT | Mod: CPTII,S$GLB,, | Performed by: NURSE PRACTITIONER

## 2021-08-26 PROCEDURE — 99999 PR PBB SHADOW E&M-EST. PATIENT-LVL IV: CPT | Mod: PBBFAC,,, | Performed by: NURSE PRACTITIONER

## 2021-08-26 PROCEDURE — 99214 PR OFFICE/OUTPT VISIT, EST, LEVL IV, 30-39 MIN: ICD-10-PCS | Mod: S$GLB,,, | Performed by: NURSE PRACTITIONER

## 2021-08-26 PROCEDURE — 3074F PR MOST RECENT SYSTOLIC BLOOD PRESSURE < 130 MM HG: ICD-10-PCS | Mod: CPTII,S$GLB,, | Performed by: NURSE PRACTITIONER

## 2021-08-26 PROCEDURE — 3008F PR BODY MASS INDEX (BMI) DOCUMENTED: ICD-10-PCS | Mod: CPTII,S$GLB,, | Performed by: NURSE PRACTITIONER

## 2021-08-26 PROCEDURE — 1160F PR REVIEW ALL MEDS BY PRESCRIBER/CLIN PHARMACIST DOCUMENTED: ICD-10-PCS | Mod: CPTII,S$GLB,, | Performed by: NURSE PRACTITIONER

## 2021-08-26 PROCEDURE — 3078F DIAST BP <80 MM HG: CPT | Mod: CPTII,S$GLB,, | Performed by: NURSE PRACTITIONER

## 2021-08-26 PROCEDURE — 71046 XR CHEST PA AND LATERAL: ICD-10-PCS | Mod: 26,,, | Performed by: RADIOLOGY

## 2021-08-26 PROCEDURE — 99214 OFFICE O/P EST MOD 30 MIN: CPT | Mod: S$GLB,,, | Performed by: NURSE PRACTITIONER

## 2021-08-26 PROCEDURE — 99999 PR PBB SHADOW E&M-EST. PATIENT-LVL IV: ICD-10-PCS | Mod: PBBFAC,,, | Performed by: NURSE PRACTITIONER

## 2021-08-26 PROCEDURE — 1159F MED LIST DOCD IN RCRD: CPT | Mod: CPTII,S$GLB,, | Performed by: NURSE PRACTITIONER

## 2021-08-26 PROCEDURE — 3078F PR MOST RECENT DIASTOLIC BLOOD PRESSURE < 80 MM HG: ICD-10-PCS | Mod: CPTII,S$GLB,, | Performed by: NURSE PRACTITIONER

## 2021-08-26 PROCEDURE — 1160F RVW MEDS BY RX/DR IN RCRD: CPT | Mod: CPTII,S$GLB,, | Performed by: NURSE PRACTITIONER

## 2021-08-26 PROCEDURE — 1159F PR MEDICATION LIST DOCUMENTED IN MEDICAL RECORD: ICD-10-PCS | Mod: CPTII,S$GLB,, | Performed by: NURSE PRACTITIONER

## 2021-08-26 PROCEDURE — 71046 X-RAY EXAM CHEST 2 VIEWS: CPT | Mod: TC

## 2021-08-26 RX ORDER — FLUTICASONE FUROATE 100 UG/1
1 POWDER RESPIRATORY (INHALATION) DAILY
Qty: 30 EACH | Refills: 11 | Status: SHIPPED | OUTPATIENT
Start: 2021-08-26 | End: 2022-08-23 | Stop reason: SDUPTHER

## 2021-08-26 RX ORDER — MONTELUKAST SODIUM 10 MG/1
10 TABLET ORAL DAILY
Qty: 90 TABLET | Refills: 3 | Status: SHIPPED | OUTPATIENT
Start: 2021-08-26 | End: 2022-05-24

## 2021-08-27 DIAGNOSIS — J45.30 MILD PERSISTENT ASTHMA WITHOUT COMPLICATION: Primary | ICD-10-CM

## 2021-08-27 RX ORDER — ALBUTEROL SULFATE 90 UG/1
AEROSOL, METERED RESPIRATORY (INHALATION)
Qty: 8.5 G | Refills: 11 | Status: SHIPPED | OUTPATIENT
Start: 2021-08-27 | End: 2022-08-23 | Stop reason: SDUPTHER

## 2021-08-31 ENCOUNTER — OFFICE VISIT (OUTPATIENT)
Dept: OPHTHALMOLOGY | Facility: CLINIC | Age: 51
End: 2021-08-31
Payer: COMMERCIAL

## 2021-08-31 ENCOUNTER — HOSPITAL ENCOUNTER (OUTPATIENT)
Dept: RADIOLOGY | Facility: HOSPITAL | Age: 51
Discharge: HOME | End: 2021-08-31
Attending: FAMILY MEDICINE
Payer: COMMERCIAL

## 2021-08-31 VITALS — HEIGHT: 59 IN | BODY MASS INDEX: 16.71 KG/M2 | WEIGHT: 82.88 LBS

## 2021-08-31 DIAGNOSIS — Z00.00 ANNUAL PHYSICAL EXAM: ICD-10-CM

## 2021-08-31 DIAGNOSIS — H52.13 MYOPIA OF BOTH EYES: Primary | ICD-10-CM

## 2021-08-31 DIAGNOSIS — Z12.31 OTHER SCREENING MAMMOGRAM: ICD-10-CM

## 2021-08-31 DIAGNOSIS — Z01.00 ENCOUNTER FOR EYE EXAM: ICD-10-CM

## 2021-08-31 PROCEDURE — 92014 COMPRE OPH EXAM EST PT 1/>: CPT | Mod: S$GLB,,, | Performed by: OPTOMETRIST

## 2021-08-31 PROCEDURE — 77067 SCR MAMMO BI INCL CAD: CPT | Mod: TC

## 2021-08-31 PROCEDURE — 92015 DETERMINE REFRACTIVE STATE: CPT | Mod: S$GLB,,, | Performed by: OPTOMETRIST

## 2021-08-31 PROCEDURE — 77063 MAMMO DIGITAL SCREENING BILAT WITH TOMO: ICD-10-PCS | Mod: 26,,, | Performed by: RADIOLOGY

## 2021-08-31 PROCEDURE — 99999 PR PBB SHADOW E&M-EST. PATIENT-LVL II: ICD-10-PCS | Mod: PBBFAC,,, | Performed by: OPTOMETRIST

## 2021-08-31 PROCEDURE — 92014 PR EYE EXAM, EST PATIENT,COMPREHESV: ICD-10-PCS | Mod: S$GLB,,, | Performed by: OPTOMETRIST

## 2021-08-31 PROCEDURE — 1159F MED LIST DOCD IN RCRD: CPT | Mod: CPTII,S$GLB,, | Performed by: OPTOMETRIST

## 2021-08-31 PROCEDURE — 92015 PR REFRACTION: ICD-10-PCS | Mod: S$GLB,,, | Performed by: OPTOMETRIST

## 2021-08-31 PROCEDURE — 77067 MAMMO DIGITAL SCREENING BILAT WITH TOMO: ICD-10-PCS | Mod: 26,,, | Performed by: RADIOLOGY

## 2021-08-31 PROCEDURE — 77063 BREAST TOMOSYNTHESIS BI: CPT | Mod: 26,,, | Performed by: RADIOLOGY

## 2021-08-31 PROCEDURE — 1159F PR MEDICATION LIST DOCUMENTED IN MEDICAL RECORD: ICD-10-PCS | Mod: CPTII,S$GLB,, | Performed by: OPTOMETRIST

## 2021-08-31 PROCEDURE — 77067 SCR MAMMO BI INCL CAD: CPT | Mod: 26,,, | Performed by: RADIOLOGY

## 2021-08-31 PROCEDURE — 99999 PR PBB SHADOW E&M-EST. PATIENT-LVL II: CPT | Mod: PBBFAC,,, | Performed by: OPTOMETRIST

## 2021-10-13 DIAGNOSIS — B96.89 BV (BACTERIAL VAGINOSIS): ICD-10-CM

## 2021-10-13 DIAGNOSIS — N76.0 BV (BACTERIAL VAGINOSIS): ICD-10-CM

## 2021-10-13 DIAGNOSIS — N39.0 URINARY TRACT INFECTION WITHOUT HEMATURIA, SITE UNSPECIFIED: Primary | ICD-10-CM

## 2021-10-13 RX ORDER — METRONIDAZOLE 500 MG/1
500 TABLET ORAL EVERY 12 HOURS
Qty: 14 TABLET | Refills: 0 | Status: SHIPPED | OUTPATIENT
Start: 2021-10-13 | End: 2021-10-20

## 2021-10-13 RX ORDER — CIPROFLOXACIN 500 MG/1
500 TABLET ORAL EVERY 12 HOURS
Qty: 6 TABLET | Refills: 0 | Status: SHIPPED | OUTPATIENT
Start: 2021-10-13 | End: 2021-10-16

## 2021-10-14 ENCOUNTER — PATIENT MESSAGE (OUTPATIENT)
Dept: FAMILY MEDICINE | Facility: CLINIC | Age: 51
End: 2021-10-14
Payer: COMMERCIAL

## 2021-10-14 ENCOUNTER — TELEPHONE (OUTPATIENT)
Dept: FAMILY MEDICINE | Facility: CLINIC | Age: 51
End: 2021-10-14

## 2022-03-21 ENCOUNTER — PATIENT MESSAGE (OUTPATIENT)
Dept: ADMINISTRATIVE | Facility: HOSPITAL | Age: 52
End: 2022-03-21
Payer: COMMERCIAL

## 2022-03-21 DIAGNOSIS — Z12.11 SCREENING FOR COLON CANCER: ICD-10-CM

## 2022-03-22 ENCOUNTER — PATIENT MESSAGE (OUTPATIENT)
Dept: RESEARCH | Facility: HOSPITAL | Age: 52
End: 2022-03-22
Payer: COMMERCIAL

## 2022-04-01 ENCOUNTER — PATIENT MESSAGE (OUTPATIENT)
Dept: ADMINISTRATIVE | Facility: HOSPITAL | Age: 52
End: 2022-04-01
Payer: COMMERCIAL

## 2022-04-01 ENCOUNTER — PATIENT OUTREACH (OUTPATIENT)
Dept: ADMINISTRATIVE | Facility: HOSPITAL | Age: 52
End: 2022-04-01
Payer: COMMERCIAL

## 2022-04-08 LAB — HEMOCCULT STL QL IA: NEGATIVE

## 2022-08-23 ENCOUNTER — OFFICE VISIT (OUTPATIENT)
Dept: FAMILY MEDICINE | Facility: CLINIC | Age: 52
End: 2022-08-23
Payer: COMMERCIAL

## 2022-08-23 ENCOUNTER — LAB VISIT (OUTPATIENT)
Dept: LAB | Facility: HOSPITAL | Age: 52
End: 2022-08-23
Attending: FAMILY MEDICINE
Payer: COMMERCIAL

## 2022-08-23 VITALS
BODY MASS INDEX: 17.82 KG/M2 | OXYGEN SATURATION: 98 % | WEIGHT: 88.38 LBS | HEIGHT: 59 IN | HEART RATE: 81 BPM | SYSTOLIC BLOOD PRESSURE: 130 MMHG | RESPIRATION RATE: 18 BRPM | DIASTOLIC BLOOD PRESSURE: 67 MMHG | TEMPERATURE: 99 F

## 2022-08-23 DIAGNOSIS — Z12.31 OTHER SCREENING MAMMOGRAM: ICD-10-CM

## 2022-08-23 DIAGNOSIS — Z00.00 ANNUAL PHYSICAL EXAM: Primary | ICD-10-CM

## 2022-08-23 DIAGNOSIS — J45.30 MILD PERSISTENT ASTHMA WITHOUT COMPLICATION: ICD-10-CM

## 2022-08-23 DIAGNOSIS — Z00.00 ANNUAL PHYSICAL EXAM: ICD-10-CM

## 2022-08-23 DIAGNOSIS — D50.9 IRON DEFICIENCY ANEMIA, UNSPECIFIED IRON DEFICIENCY ANEMIA TYPE: ICD-10-CM

## 2022-08-23 LAB
ALBUMIN SERPL BCP-MCNC: 4 G/DL (ref 3.5–5.2)
ALP SERPL-CCNC: 56 U/L (ref 55–135)
ALT SERPL W/O P-5'-P-CCNC: 13 U/L (ref 10–44)
ANION GAP SERPL CALC-SCNC: 7 MMOL/L (ref 8–16)
AST SERPL-CCNC: 17 U/L (ref 10–40)
BASOPHILS # BLD AUTO: 0.05 K/UL (ref 0–0.2)
BASOPHILS NFR BLD: 0.7 % (ref 0–1.9)
BILIRUB SERPL-MCNC: 0.7 MG/DL (ref 0.1–1)
BUN SERPL-MCNC: 8 MG/DL (ref 6–20)
CALCIUM SERPL-MCNC: 9.4 MG/DL (ref 8.7–10.5)
CHLORIDE SERPL-SCNC: 105 MMOL/L (ref 95–110)
CHOLEST SERPL-MCNC: 206 MG/DL (ref 120–199)
CHOLEST/HDLC SERPL: 2.8 {RATIO} (ref 2–5)
CO2 SERPL-SCNC: 24 MMOL/L (ref 23–29)
CREAT SERPL-MCNC: 0.6 MG/DL (ref 0.5–1.4)
DIFFERENTIAL METHOD: NORMAL
EOSINOPHIL # BLD AUTO: 0.1 K/UL (ref 0–0.5)
EOSINOPHIL NFR BLD: 0.9 % (ref 0–8)
ERYTHROCYTE [DISTWIDTH] IN BLOOD BY AUTOMATED COUNT: 12.3 % (ref 11.5–14.5)
EST. GFR  (NO RACE VARIABLE): >60 ML/MIN/1.73 M^2
FERRITIN SERPL-MCNC: 89 NG/ML (ref 20–300)
GLUCOSE SERPL-MCNC: 83 MG/DL (ref 70–110)
HCT VFR BLD AUTO: 40.4 % (ref 37–48.5)
HDLC SERPL-MCNC: 74 MG/DL (ref 40–75)
HDLC SERPL: 35.9 % (ref 20–50)
HGB BLD-MCNC: 13.5 G/DL (ref 12–16)
IMM GRANULOCYTES # BLD AUTO: 0.02 K/UL (ref 0–0.04)
IMM GRANULOCYTES NFR BLD AUTO: 0.3 % (ref 0–0.5)
LDLC SERPL CALC-MCNC: 121.4 MG/DL (ref 63–159)
LYMPHOCYTES # BLD AUTO: 1.7 K/UL (ref 1–4.8)
LYMPHOCYTES NFR BLD: 25 % (ref 18–48)
MCH RBC QN AUTO: 31 PG (ref 27–31)
MCHC RBC AUTO-ENTMCNC: 33.4 G/DL (ref 32–36)
MCV RBC AUTO: 93 FL (ref 82–98)
MONOCYTES # BLD AUTO: 0.6 K/UL (ref 0.3–1)
MONOCYTES NFR BLD: 9.3 % (ref 4–15)
NEUTROPHILS # BLD AUTO: 4.4 K/UL (ref 1.8–7.7)
NEUTROPHILS NFR BLD: 63.8 % (ref 38–73)
NONHDLC SERPL-MCNC: 132 MG/DL
NRBC BLD-RTO: 0 /100 WBC
PLATELET # BLD AUTO: 220 K/UL (ref 150–450)
PMV BLD AUTO: 11.3 FL (ref 9.2–12.9)
POTASSIUM SERPL-SCNC: 4 MMOL/L (ref 3.5–5.1)
PROT SERPL-MCNC: 8.3 G/DL (ref 6–8.4)
RBC # BLD AUTO: 4.35 M/UL (ref 4–5.4)
SODIUM SERPL-SCNC: 136 MMOL/L (ref 136–145)
TRIGL SERPL-MCNC: 53 MG/DL (ref 30–150)
TSH SERPL DL<=0.005 MIU/L-ACNC: 1.1 UIU/ML (ref 0.4–4)
WBC # BLD AUTO: 6.85 K/UL (ref 3.9–12.7)

## 2022-08-23 PROCEDURE — 84443 ASSAY THYROID STIM HORMONE: CPT | Performed by: FAMILY MEDICINE

## 2022-08-23 PROCEDURE — 3078F PR MOST RECENT DIASTOLIC BLOOD PRESSURE < 80 MM HG: ICD-10-PCS | Mod: CPTII,S$GLB,, | Performed by: FAMILY MEDICINE

## 2022-08-23 PROCEDURE — 36415 COLL VENOUS BLD VENIPUNCTURE: CPT | Mod: PO | Performed by: FAMILY MEDICINE

## 2022-08-23 PROCEDURE — 99999 PR PBB SHADOW E&M-EST. PATIENT-LVL IV: ICD-10-PCS | Mod: PBBFAC,,, | Performed by: FAMILY MEDICINE

## 2022-08-23 PROCEDURE — 99396 PREV VISIT EST AGE 40-64: CPT | Mod: S$GLB,,, | Performed by: FAMILY MEDICINE

## 2022-08-23 PROCEDURE — 3078F DIAST BP <80 MM HG: CPT | Mod: CPTII,S$GLB,, | Performed by: FAMILY MEDICINE

## 2022-08-23 PROCEDURE — 85025 COMPLETE CBC W/AUTO DIFF WBC: CPT | Performed by: FAMILY MEDICINE

## 2022-08-23 PROCEDURE — 3075F SYST BP GE 130 - 139MM HG: CPT | Mod: CPTII,S$GLB,, | Performed by: FAMILY MEDICINE

## 2022-08-23 PROCEDURE — 3008F BODY MASS INDEX DOCD: CPT | Mod: CPTII,S$GLB,, | Performed by: FAMILY MEDICINE

## 2022-08-23 PROCEDURE — 1159F MED LIST DOCD IN RCRD: CPT | Mod: CPTII,S$GLB,, | Performed by: FAMILY MEDICINE

## 2022-08-23 PROCEDURE — 99396 PR PREVENTIVE VISIT,EST,40-64: ICD-10-PCS | Mod: S$GLB,,, | Performed by: FAMILY MEDICINE

## 2022-08-23 PROCEDURE — 80061 LIPID PANEL: CPT | Performed by: FAMILY MEDICINE

## 2022-08-23 PROCEDURE — 99999 PR PBB SHADOW E&M-EST. PATIENT-LVL IV: CPT | Mod: PBBFAC,,, | Performed by: FAMILY MEDICINE

## 2022-08-23 PROCEDURE — 80053 COMPREHEN METABOLIC PANEL: CPT | Performed by: FAMILY MEDICINE

## 2022-08-23 PROCEDURE — 82728 ASSAY OF FERRITIN: CPT | Performed by: FAMILY MEDICINE

## 2022-08-23 PROCEDURE — 88175 CYTOPATH C/V AUTO FLUID REDO: CPT | Performed by: FAMILY MEDICINE

## 2022-08-23 PROCEDURE — 1160F PR REVIEW ALL MEDS BY PRESCRIBER/CLIN PHARMACIST DOCUMENTED: ICD-10-PCS | Mod: CPTII,S$GLB,, | Performed by: FAMILY MEDICINE

## 2022-08-23 PROCEDURE — 87624 HPV HI-RISK TYP POOLED RSLT: CPT | Performed by: FAMILY MEDICINE

## 2022-08-23 PROCEDURE — 3008F PR BODY MASS INDEX (BMI) DOCUMENTED: ICD-10-PCS | Mod: CPTII,S$GLB,, | Performed by: FAMILY MEDICINE

## 2022-08-23 PROCEDURE — 1159F PR MEDICATION LIST DOCUMENTED IN MEDICAL RECORD: ICD-10-PCS | Mod: CPTII,S$GLB,, | Performed by: FAMILY MEDICINE

## 2022-08-23 PROCEDURE — 87625 HPV TYPES 16 & 18 ONLY: CPT | Performed by: FAMILY MEDICINE

## 2022-08-23 PROCEDURE — 3075F PR MOST RECENT SYSTOLIC BLOOD PRESS GE 130-139MM HG: ICD-10-PCS | Mod: CPTII,S$GLB,, | Performed by: FAMILY MEDICINE

## 2022-08-23 PROCEDURE — 1160F RVW MEDS BY RX/DR IN RCRD: CPT | Mod: CPTII,S$GLB,, | Performed by: FAMILY MEDICINE

## 2022-08-23 RX ORDER — FLUTICASONE FUROATE 100 UG/1
1 POWDER RESPIRATORY (INHALATION) DAILY
Qty: 30 EACH | Refills: 11 | Status: SHIPPED | OUTPATIENT
Start: 2022-08-23 | End: 2023-08-29 | Stop reason: SDUPTHER

## 2022-08-23 RX ORDER — ALBUTEROL SULFATE 90 UG/1
AEROSOL, METERED RESPIRATORY (INHALATION)
Qty: 8.5 G | Refills: 11 | Status: SHIPPED | OUTPATIENT
Start: 2022-08-23 | End: 2023-08-29 | Stop reason: SDUPTHER

## 2022-08-23 NOTE — PROGRESS NOTES
HISTORY OF PRESENT ILLNESS: Ms. Aquino comes in today fasting and without problems for annual wellness exam.      END OF DECISION: She does not have a living will and is not sure about life support.     Current Outpatient Medications   Medication Sig    loratadine (CLARITIN) 10 mg tablet Take 10 mg by mouth once daily.    montelukast (SINGULAIR) 10 mg tablet TAKE 1 TABLET(10 MG) BY MOUTH EVERY DAY    albuterol (PROAIR HFA) 90 mcg/actuation inhaler INHALE 2 PUFFS INTO THE LUNGS EVERY 6 HOURS AS NEEDED FOR WHEEZING    fluticasone furoate (ARNUITY ELLIPTA) 100 mcg/actuation inhaler Inhale 1 puff into the lungs once daily. Controller      SCREENINGS:   Cholesterol: August 26, 2021.   FFS/Colonoscopy: Never. Colonoscopy next year as patient has family history of colon cancer. April 1, 2022 FIT-KIT - okay.   Mammogram: August 31, 2021 - okay.   GYN Exam: August 17, 2021 pap smear - okay; HPV - positive. Pap smear due today.   Dexa Scan: Never.  Eye Exam: August 31, 2021 with Dr. Sahu. She wears glasses.  PPD: Negative in the past per patient.  Immunizations: Tdap - July 12, 2012. She declines.  Gardasil - N./A.  Zostavax - N./A.  Pneumovax - never. She declines.  Prevnar - never. She declines.  Seasonal Flu - N./A. She declines.  Shingrix - Never. Reports no history of varicella or zoster.   Covid-19 vaccine - Never.    ROS:  GENERAL: Denies fever, chills, fatigue or unusual weight change. Appetite normal. Weight 37.7 kg (83 lb 1.8 oz) at August 17, 2021 visit. Exercises/walks daily and monitors diet.  SKIN: Denies rashes, itching, changes in mole, color or texture of skin or easy bruising.  HEAD: Denies headaches or recent head trauma.  EYES: Denies change in vision, pain, diplopia, redness or discharge.   EARS: Denies ear pain, discharge, vertigo or decreased hearing.  NOSE: Denies loss of smell, epistaxis or rhinitis.  MOUTH & THROAT: Denies hoarseness or change in voice. Denies excessive gum bleeding or mouth  "sores. Denies sore throat.  NODES: Denies swollen glands.  CHEST: Denies JAIMES, wheezing, cough, or sputum production. Saw NP Bhumika Jonas with pulmonary on August 26, 2021 for asthma surveillance with 6-month follow up with spirometry advised.   CARDIOVASCULAR: Denies chest pain, PND, orthopnea or reduced exercise tolerance. Denies palpitations.  ABDOMEN: Denies diarrhea, constipation, nausea, vomiting, abdominal pain, or blood in stool.  URINARY: Denies flank pain, dysuria or hematuria.  GENITOURINARY: Denies flank pain, dysuria, frequency or hematuria. No change in menses. Occasionally performs monthly breast self exams.   ENDOCRINE: Denies diabetes, thyroid, cholesterol problems.  HEME/LYMPH: Denies bleeding problems.   PERIPHERAL VASCULAR:Denies claudication or cyanosis.  MUSCULOSKELETAL: Denies joint stiffness, pain or swelling. Denies edema.  NEUROLOGIC: Denies history of seizures, tremors, paralysis, alteration of gait or coordination.  PSYCHIATRIC: Denies mood swings, depression, anxiety, homicidal or suicidal thoughts. Denies sleep problems.     PE:   VS: Blood Pressure 130/67   Pulse 81   Temperature 99.4 °F (37.4 °C)   Respiration 18   Height 4' 11" (1.499 m)   Weight 40.1 kg (88 lb 6.5 oz)   Last Menstrual Period 08/01/2022 Comment: Monthly  Oxygen Saturation 98%   Body Mass Index 17.86 kg/m²   APPEARANCE: Well nourished, well developed female, pleasant, alert and oriented in no acute distress.   HEAD: Non tender. Full range of motion.  EYES: PERRL, conjunctiva pink, lids no edema.  EARS: External canals not patent. No swelling or redness. TM's not visualized due to impacted cerumen.   NOSE: Mucosa and turbinates pink, not swollen. No discharge. Non tender sinuses.  THROAT: No pharyngeal erythema or exudate. No stridor.   NECK: Supple, no mass, thyroid not enlarged.  NODES: No cervical, axillary or inguinal lymph node enlargement.  CHEST: Normal respiratory effort. Lungs clear to " auscultation.  CARDIOVASCULAR: Normal S1, S2. No rubs, murmurs or gallops. PMI not displaced. No carotid bruit. Pedal pulses palpable bilaterally. No edema.  ABDOMEN: Bowel sounds present. Not distended. Soft. No tenderness, masses or organomegaly.  BREAST EXAM: Symmetrical, no external lesions, no discharge, no masses palpated.  PELVIC EXAM: No external lesions noted, no discharge, cervix appears normal, bimanual exam showed no uterine abnormalities and no adnexal masses. Urethra and bladder intact.  RECTAL EXAM: No external hemorrhoids or anal fissures but non-inflamed hemorrhoidal tag noted. Heme negative stool in rectal vault.   MUSCULOSKELETAL: No joint deformities or stiffness. She is ambulatory without problems.  SKIN: No rashes or suspicious lesions, normal color and turgor.  NEUROLOGIC: Cranial Nerves: II-XII grossly intact. DTR's: Knees, Ankles 2+ and equal bilaterally. Gait & Posture: Normal gait and fine motion.  PSYCHIATRIC: Patient alert, oriented x 3. Mood/Affect normal without acute anxiety or depression noted. Judgment/insight good as she makes appropriate decisions on today's examination.     ASSESSMENT:    ICD-10-CM ICD-9-CM    1. Annual physical exam  Z00.00 V70.0 CBC Auto Differential      TSH      Comprehensive Metabolic Panel      Lipid Panel      Ferritin      Liquid-Based Pap Smear, Screening      HPV High Risk Genotypes, PCR   2. Iron deficiency anemia, unspecified iron deficiency anemia type  D50.9 280.9    3. Mild persistent asthma without complication  J45.30 493.90 fluticasone furoate (ARNUITY ELLIPTA) 100 mcg/actuation inhaler      albuterol (PROAIR HFA) 90 mcg/actuation inhaler   4. Other screening mammogram  Z12.31 V76.12 Mammo Digital Screening Bilat w/ John     PLAN:  1. Age-appropriate counseling-appropriate low-sodium, low-cholesterol, low carbohydrate diet and exercise daily, monthly breast self exam, annual wellness examination.   2. Patient advised to call for results.  3.  Continue current medications.  4. Prescription refills as noted above.  5. Keep follow up with specialists.  6. Follow up in about 52 weeks (around 8/22/2023) for physical.

## 2022-08-29 LAB
CLINICAL INFO: ABNORMAL
CYTO CVX: ABNORMAL
CYTOLOGIST CVX/VAG CYTO: ABNORMAL
CYTOLOGIST CVX/VAG CYTO: ABNORMAL
CYTOLOGY CMNT CVX/VAG CYTO-IMP: ABNORMAL
CYTOLOGY PAP THIN PREP EXPLANATION: ABNORMAL
DATE OF PREVIOUS PAP: ABNORMAL
DATE PREVIOUS BX: NO
GEN CATEG CVX/VAG CYTO-IMP: ABNORMAL
HPV I/H RISK 4 DNA CVX QL NAA+PROBE: DETECTED
HPV16 DNA CVX QL PROBE+SIG AMP: NOT DETECTED
HPV18 DNA CVX QL PROBE+SIG AMP: NOT DETECTED
LMP START DATE: ABNORMAL
MICROORGANISM CVX/VAG CYTO: ABNORMAL
PATHOLOGIST CVX/VAG CYTO: ABNORMAL
SERVICE CMNT-IMP: ABNORMAL
SPECIMEN SOURCE CVX/VAG CYTO: ABNORMAL
STAT OF ADQ CVX/VAG CYTO-IMP: ABNORMAL

## 2022-08-30 DIAGNOSIS — R87.612 LGSIL ON PAP SMEAR OF CERVIX: Primary | ICD-10-CM

## 2022-08-31 ENCOUNTER — TELEPHONE (OUTPATIENT)
Dept: FAMILY MEDICINE | Facility: CLINIC | Age: 52
End: 2022-08-31
Payer: COMMERCIAL

## 2022-08-31 NOTE — TELEPHONE ENCOUNTER
----- Message from Rea Kim MD sent at 8/30/2022  7:19 PM CDT -----  Advise pt pap smear is abnormal; HPV is positive.  Needs to see GYN for further evaluation.  Please assist her with appt. Thanks.

## 2022-09-13 ENCOUNTER — OFFICE VISIT (OUTPATIENT)
Dept: OBSTETRICS AND GYNECOLOGY | Facility: CLINIC | Age: 52
End: 2022-09-13
Payer: COMMERCIAL

## 2022-09-13 VITALS
HEIGHT: 59 IN | WEIGHT: 91.25 LBS | BODY MASS INDEX: 18.4 KG/M2 | DIASTOLIC BLOOD PRESSURE: 65 MMHG | SYSTOLIC BLOOD PRESSURE: 100 MMHG

## 2022-09-13 DIAGNOSIS — R87.612 LGSIL ON PAP SMEAR OF CERVIX: ICD-10-CM

## 2022-09-13 PROCEDURE — 99202 OFFICE O/P NEW SF 15 MIN: CPT | Mod: S$GLB,,, | Performed by: NURSE PRACTITIONER

## 2022-09-13 PROCEDURE — 1159F MED LIST DOCD IN RCRD: CPT | Mod: CPTII,S$GLB,, | Performed by: NURSE PRACTITIONER

## 2022-09-13 PROCEDURE — 3008F PR BODY MASS INDEX (BMI) DOCUMENTED: ICD-10-PCS | Mod: CPTII,S$GLB,, | Performed by: NURSE PRACTITIONER

## 2022-09-13 PROCEDURE — 3078F DIAST BP <80 MM HG: CPT | Mod: CPTII,S$GLB,, | Performed by: NURSE PRACTITIONER

## 2022-09-13 PROCEDURE — 1160F PR REVIEW ALL MEDS BY PRESCRIBER/CLIN PHARMACIST DOCUMENTED: ICD-10-PCS | Mod: CPTII,S$GLB,, | Performed by: NURSE PRACTITIONER

## 2022-09-13 PROCEDURE — 99999 PR PBB SHADOW E&M-EST. PATIENT-LVL III: ICD-10-PCS | Mod: PBBFAC,,, | Performed by: NURSE PRACTITIONER

## 2022-09-13 PROCEDURE — 3078F PR MOST RECENT DIASTOLIC BLOOD PRESSURE < 80 MM HG: ICD-10-PCS | Mod: CPTII,S$GLB,, | Performed by: NURSE PRACTITIONER

## 2022-09-13 PROCEDURE — 3074F SYST BP LT 130 MM HG: CPT | Mod: CPTII,S$GLB,, | Performed by: NURSE PRACTITIONER

## 2022-09-13 PROCEDURE — 3008F BODY MASS INDEX DOCD: CPT | Mod: CPTII,S$GLB,, | Performed by: NURSE PRACTITIONER

## 2022-09-13 PROCEDURE — 99999 PR PBB SHADOW E&M-EST. PATIENT-LVL III: CPT | Mod: PBBFAC,,, | Performed by: NURSE PRACTITIONER

## 2022-09-13 PROCEDURE — 1160F RVW MEDS BY RX/DR IN RCRD: CPT | Mod: CPTII,S$GLB,, | Performed by: NURSE PRACTITIONER

## 2022-09-13 PROCEDURE — 99202 PR OFFICE/OUTPT VISIT, NEW, LEVL II, 15-29 MIN: ICD-10-PCS | Mod: S$GLB,,, | Performed by: NURSE PRACTITIONER

## 2022-09-13 PROCEDURE — 3074F PR MOST RECENT SYSTOLIC BLOOD PRESSURE < 130 MM HG: ICD-10-PCS | Mod: CPTII,S$GLB,, | Performed by: NURSE PRACTITIONER

## 2022-09-13 PROCEDURE — 1159F PR MEDICATION LIST DOCUMENTED IN MEDICAL RECORD: ICD-10-PCS | Mod: CPTII,S$GLB,, | Performed by: NURSE PRACTITIONER

## 2022-09-13 NOTE — PROGRESS NOTES
Subjective:       Patient ID: Ernesto Aquino is a 52 y.o. female.    Chief Complaint:  Abnormal Pap Smear    Patient's last menstrual period was 2022 (exact date).  History of Present Illness  Referred by Dr.Janet Kim for abnormal pap f/u   Abnormal pap smear results are LSIL     OB History    Para Term  AB Living   0             SAB IAB Ectopic Multiple Live Births                   Review of Systems  Review of Systems        Objective:    Physical Exam      Assessment:     1. LGSIL on Pap smear of cervix              Plan:   Ernesto was seen today for abnormal pap smear.    Diagnoses and all orders for this visit:    LGSIL on Pap smear of cervix  -     Ambulatory referral/consult to Gynecology      Your pap smear results are ASCUS HPV POS other.  This does not mean you have cervical cancer. You will need a procedure called a colposcopy. A colposcopy is a procedure that allows the provider to get a closer look at your cervix. It is a type of cervical cancer screening test that is used to find abnormal cells on your cervix. Please be sure and take Ibuprofen 800 mg prior to coming to your appt.     Verbalizes understanding and has scheduled colposcopy to be done on 2022 at 930 am (after mammogram)

## 2022-09-27 ENCOUNTER — HOSPITAL ENCOUNTER (OUTPATIENT)
Dept: RADIOLOGY | Facility: HOSPITAL | Age: 52
Discharge: HOME | End: 2022-09-27
Attending: FAMILY MEDICINE
Payer: COMMERCIAL

## 2022-09-27 ENCOUNTER — PROCEDURE VISIT (OUTPATIENT)
Dept: OBSTETRICS AND GYNECOLOGY | Facility: CLINIC | Age: 52
End: 2022-09-27
Payer: COMMERCIAL

## 2022-09-27 VITALS
BODY MASS INDEX: 18.44 KG/M2 | DIASTOLIC BLOOD PRESSURE: 76 MMHG | SYSTOLIC BLOOD PRESSURE: 112 MMHG | WEIGHT: 91.5 LBS | HEIGHT: 59 IN

## 2022-09-27 DIAGNOSIS — Z12.31 OTHER SCREENING MAMMOGRAM: ICD-10-CM

## 2022-09-27 DIAGNOSIS — R87.810 CERVICAL HIGH RISK HPV (HUMAN PAPILLOMAVIRUS) TEST POSITIVE: ICD-10-CM

## 2022-09-27 DIAGNOSIS — Z32.00 POSSIBLE PREGNANCY: Primary | ICD-10-CM

## 2022-09-27 LAB
B-HCG UR QL: NEGATIVE
CTP QC/QA: YES

## 2022-09-27 PROCEDURE — 57456 COLPOSCOPY: ICD-10-PCS | Mod: S$GLB,,, | Performed by: NURSE PRACTITIONER

## 2022-09-27 PROCEDURE — 81025 URINE PREGNANCY TEST: CPT | Mod: S$GLB,,, | Performed by: NURSE PRACTITIONER

## 2022-09-27 PROCEDURE — 88305 TISSUE EXAM BY PATHOLOGIST: ICD-10-PCS | Mod: 26,,, | Performed by: PATHOLOGY

## 2022-09-27 PROCEDURE — 88305 TISSUE EXAM BY PATHOLOGIST: CPT | Mod: 26,,, | Performed by: PATHOLOGY

## 2022-09-27 PROCEDURE — 57456 ENDOCERV CURETTAGE W/SCOPE: CPT | Mod: S$GLB,,, | Performed by: NURSE PRACTITIONER

## 2022-09-27 PROCEDURE — 77067 SCR MAMMO BI INCL CAD: CPT | Mod: 26,,, | Performed by: RADIOLOGY

## 2022-09-27 PROCEDURE — 77063 MAMMO DIGITAL SCREENING BILAT WITH TOMO: ICD-10-PCS | Mod: 26,,, | Performed by: RADIOLOGY

## 2022-09-27 PROCEDURE — 88305 TISSUE EXAM BY PATHOLOGIST: CPT | Performed by: PATHOLOGY

## 2022-09-27 PROCEDURE — 81025 POCT URINE PREGNANCY: ICD-10-PCS | Mod: S$GLB,,, | Performed by: NURSE PRACTITIONER

## 2022-09-27 PROCEDURE — 77067 MAMMO DIGITAL SCREENING BILAT WITH TOMO: ICD-10-PCS | Mod: 26,,, | Performed by: RADIOLOGY

## 2022-09-27 PROCEDURE — 77067 SCR MAMMO BI INCL CAD: CPT | Mod: TC

## 2022-09-27 PROCEDURE — 77063 BREAST TOMOSYNTHESIS BI: CPT | Mod: 26,,, | Performed by: RADIOLOGY

## 2022-09-27 NOTE — PROCEDURES
Colposcopy    Date/Time: 9/27/2022 9:30 AM  Performed by: Shwetha Ceballos NP  Authorized by: Shwetha Ceballos NP     Consent Done?:  Yes (Verbal) and Yes (Written)  Timeout:Immediately prior to procedure a time out was called to verify the correct patient, procedure, equipment, support staff and site/side marked as required  Prep:Patient was prepped and draped in the usual sterile fashion  Assistants?: Yes    List of assistants:  JACQUELIN PASTOR I was present for the entire procedure.    Colposcopy Site:  Cervix  Position:  Supine   Patient was prepped and draped in the normal sterile fashion.  Acrowhite Lesion: No    Atypical Vessels: No    Transformation Zone Adequate?: Yes    Biopsy?: No    ECC Performed?: Yes    LEEP Performed?: No    Estimated blood loss (cc):  10   Patient tolerated the procedure well with no immediate complications.   Post-operative instructions were provided for the patient.   Patient was discharged and will follow up if any complications occur     Impression Negative

## 2022-09-30 LAB
FINAL PATHOLOGIC DIAGNOSIS: NORMAL
GROSS: NORMAL
Lab: NORMAL

## 2022-11-09 ENCOUNTER — PATIENT MESSAGE (OUTPATIENT)
Dept: RESEARCH | Facility: HOSPITAL | Age: 52
End: 2022-11-09
Payer: COMMERCIAL

## 2023-02-14 ENCOUNTER — NURSE TRIAGE (OUTPATIENT)
Dept: ADMINISTRATIVE | Facility: CLINIC | Age: 53
End: 2023-02-14

## 2023-02-14 NOTE — TELEPHONE ENCOUNTER
Pt reports on Saturday she was shivering and cold. States that she had dizziness, lightheadedness and passed out and woke up on bathroom floor on Sunday. Now c/o SOB and having difficulty walking to bathroom with SOB and lightheaded.  Advised to go to ED now per protocol. Pt verbalized that someone is with her to bring her to the ED. Encounter routed to provider.       Reason for Disposition   MODERATE difficulty breathing (e.g., speaks in phrases, SOB even at rest, pulse 100-120) of new-onset or worse than normal    Additional Information   Negative: SEVERE difficulty breathing (e.g., struggling for each breath, speaks in single words, pulse > 120)   Negative: Breathing stopped and hasn't returned   Negative: Choking on something   Negative: Bluish (or gray) lips or face   Negative: Difficult to awaken or acting confused (e.g., disoriented, slurred speech)   Negative: Passed out (i.e., fainted, collapsed and was not responding)   Negative: Wheezing started suddenly after medicine, an allergic food, or bee sting   Negative: Stridor   Negative: Slow, shallow and weak breathing   Negative: Sounds like a life-threatening emergency to the triager    Protocols used: Breathing Difficulty-A-OH    
992
Xray Forearm, Left

## 2023-02-17 ENCOUNTER — NURSE TRIAGE (OUTPATIENT)
Dept: ADMINISTRATIVE | Facility: CLINIC | Age: 53
End: 2023-02-17

## 2023-02-18 NOTE — TELEPHONE ENCOUNTER
"Pt calls c/o hemorrhoid. She was recently hospitalized and states, "I developed a hemorrhoid in the hospital and today it's really big, sore, and bleeding." She notes a small amount of rectal bleeding; "It only gets on the toilet paper."     Care Advice recommends that the pt be seen by PCP within the next 2 weeks. Home care education reviewed with pt who verbalizes understanding. She is instructed to call back with any new/worsening sxs, questions, or concerns; she verbalizes understanding.   Reason for Disposition   [1] Rectal bleeding is minimal (e.g., blood just on toilet paper, few drops, streaks on surface of normal formed BM) AND [2] bleeding recurs 3 or more times on treatment    Additional Information   Negative: Shock suspected (e.g., cold/pale/clammy skin, too weak to stand, low BP, rapid pulse)   Negative: Difficult to awaken or acting confused (e.g., disoriented, slurred speech)   Negative: Passed out (i.e., lost consciousness, collapsed and was not responding)   Negative: [1] Vomiting AND [2] contains red blood or black ("coffee ground") material  (Exception: few red streaks in vomit that only happened once)   Negative: Sounds like a life-threatening emergency to the triager   Negative: SEVERE rectal bleeding (large blood clots; constant or on and off bleeding)   Negative: SEVERE dizziness (e.g., unable to stand, requires support to walk, feels like passing out now)   Negative: [1] MODERATE rectal bleeding (small blood clots, passing blood without stool, or toilet water turns red) AND [2] more than once a day   Negative: Pale skin (pallor) of new-onset or worsening   Negative: Black or tarry bowel movements (Exception: chronic-unchanged black-grey bowel movements AND is taking iron pills or Pepto-bismol)   Negative: [1] Constant abdominal pain AND [2] present > 2 hours   Negative: Rectal foreign body (i.e., now or within past week;  inserted or swallowed)   Negative: High-risk adult (e.g., prior " surgery on aorta, abdominal aortic aneurysm)   Negative: Taking Coumadin (warfarin) or other strong blood thinner, or known bleeding disorder (e.g., thrombocytopenia)   Negative: Known cirrhosis of the liver (or history of liver failure or ascites)   Negative: [1] Colonoscopy AND [2] in past 72 hours   Negative: Patient sounds very sick or weak to the triager   Negative: MODERATE rectal bleeding (small blood clots, passing blood without stool, or toilet water turns red)   Negative: MILD rectal bleeding (more than just a few drops or streaks)   Negative: Cancer of rectum or intestines (colon)   Negative: Radiation therapy to lower abdomen or pelvis    Protocols used: Rectal Bleeding-A-AH

## 2023-04-19 ENCOUNTER — PATIENT MESSAGE (OUTPATIENT)
Dept: ADMINISTRATIVE | Facility: HOSPITAL | Age: 53
End: 2023-04-19
Payer: COMMERCIAL

## 2023-08-29 ENCOUNTER — TELEPHONE (OUTPATIENT)
Dept: FAMILY MEDICINE | Facility: CLINIC | Age: 53
End: 2023-08-29
Payer: COMMERCIAL

## 2023-08-29 ENCOUNTER — LAB VISIT (OUTPATIENT)
Dept: LAB | Facility: HOSPITAL | Age: 53
End: 2023-08-29
Attending: FAMILY MEDICINE
Payer: COMMERCIAL

## 2023-08-29 ENCOUNTER — PATIENT MESSAGE (OUTPATIENT)
Dept: FAMILY MEDICINE | Facility: CLINIC | Age: 53
End: 2023-08-29
Payer: COMMERCIAL

## 2023-08-29 ENCOUNTER — OFFICE VISIT (OUTPATIENT)
Dept: FAMILY MEDICINE | Facility: CLINIC | Age: 53
End: 2023-08-29
Payer: COMMERCIAL

## 2023-08-29 VITALS
OXYGEN SATURATION: 96 % | RESPIRATION RATE: 18 BRPM | DIASTOLIC BLOOD PRESSURE: 72 MMHG | HEIGHT: 59 IN | SYSTOLIC BLOOD PRESSURE: 102 MMHG | HEART RATE: 105 BPM | TEMPERATURE: 96 F | BODY MASS INDEX: 18 KG/M2 | WEIGHT: 89.31 LBS

## 2023-08-29 DIAGNOSIS — Z00.00 ANNUAL PHYSICAL EXAM: ICD-10-CM

## 2023-08-29 DIAGNOSIS — J45.30 MILD PERSISTENT ASTHMA WITHOUT COMPLICATION: ICD-10-CM

## 2023-08-29 DIAGNOSIS — Z12.31 OTHER SCREENING MAMMOGRAM: ICD-10-CM

## 2023-08-29 DIAGNOSIS — J20.9 ACUTE BRONCHITIS, UNSPECIFIED ORGANISM: ICD-10-CM

## 2023-08-29 DIAGNOSIS — Z23 NEED FOR TD VACCINE: ICD-10-CM

## 2023-08-29 DIAGNOSIS — D50.9 IRON DEFICIENCY ANEMIA, UNSPECIFIED IRON DEFICIENCY ANEMIA TYPE: ICD-10-CM

## 2023-08-29 DIAGNOSIS — Z00.00 ANNUAL PHYSICAL EXAM: Primary | ICD-10-CM

## 2023-08-29 LAB
ALBUMIN SERPL BCP-MCNC: 3.8 G/DL (ref 3.5–5.2)
ALP SERPL-CCNC: 65 U/L (ref 55–135)
ALT SERPL W/O P-5'-P-CCNC: 60 U/L (ref 10–44)
ANION GAP SERPL CALC-SCNC: 11 MMOL/L (ref 8–16)
AST SERPL-CCNC: 40 U/L (ref 10–40)
BASOPHILS # BLD AUTO: 0.05 K/UL (ref 0–0.2)
BASOPHILS NFR BLD: 0.9 % (ref 0–1.9)
BILIRUB SERPL-MCNC: 0.4 MG/DL (ref 0.1–1)
BUN SERPL-MCNC: 12 MG/DL (ref 6–20)
CALCIUM SERPL-MCNC: 9.5 MG/DL (ref 8.7–10.5)
CHLORIDE SERPL-SCNC: 106 MMOL/L (ref 95–110)
CHOLEST SERPL-MCNC: 186 MG/DL (ref 120–199)
CHOLEST/HDLC SERPL: 2.9 {RATIO} (ref 2–5)
CO2 SERPL-SCNC: 23 MMOL/L (ref 23–29)
CREAT SERPL-MCNC: 0.5 MG/DL (ref 0.5–1.4)
DIFFERENTIAL METHOD: ABNORMAL
EOSINOPHIL # BLD AUTO: 0.1 K/UL (ref 0–0.5)
EOSINOPHIL NFR BLD: 1.1 % (ref 0–8)
ERYTHROCYTE [DISTWIDTH] IN BLOOD BY AUTOMATED COUNT: 12 % (ref 11.5–14.5)
EST. GFR  (NO RACE VARIABLE): >60 ML/MIN/1.73 M^2
FERRITIN SERPL-MCNC: 215 NG/ML (ref 20–300)
GLUCOSE SERPL-MCNC: 91 MG/DL (ref 70–110)
HCT VFR BLD AUTO: 41.1 % (ref 37–48.5)
HDLC SERPL-MCNC: 64 MG/DL (ref 40–75)
HDLC SERPL: 34.4 % (ref 20–50)
HGB BLD-MCNC: 12.8 G/DL (ref 12–16)
IMM GRANULOCYTES # BLD AUTO: 0.01 K/UL (ref 0–0.04)
IMM GRANULOCYTES NFR BLD AUTO: 0.2 % (ref 0–0.5)
LDLC SERPL CALC-MCNC: 111.8 MG/DL (ref 63–159)
LYMPHOCYTES # BLD AUTO: 1.7 K/UL (ref 1–4.8)
LYMPHOCYTES NFR BLD: 30.4 % (ref 18–48)
MCH RBC QN AUTO: 27.9 PG (ref 27–31)
MCHC RBC AUTO-ENTMCNC: 31.1 G/DL (ref 32–36)
MCV RBC AUTO: 90 FL (ref 82–98)
MONOCYTES # BLD AUTO: 0.6 K/UL (ref 0.3–1)
MONOCYTES NFR BLD: 11.4 % (ref 4–15)
NEUTROPHILS # BLD AUTO: 3.1 K/UL (ref 1.8–7.7)
NEUTROPHILS NFR BLD: 56 % (ref 38–73)
NONHDLC SERPL-MCNC: 122 MG/DL
NRBC BLD-RTO: 0 /100 WBC
PLATELET # BLD AUTO: 225 K/UL (ref 150–450)
PMV BLD AUTO: 11.9 FL (ref 9.2–12.9)
POTASSIUM SERPL-SCNC: 4.1 MMOL/L (ref 3.5–5.1)
PROT SERPL-MCNC: 7.9 G/DL (ref 6–8.4)
RBC # BLD AUTO: 4.59 M/UL (ref 4–5.4)
SODIUM SERPL-SCNC: 140 MMOL/L (ref 136–145)
T4 FREE SERPL-MCNC: 1.59 NG/DL (ref 0.71–1.51)
TRIGL SERPL-MCNC: 51 MG/DL (ref 30–150)
TSH SERPL DL<=0.005 MIU/L-ACNC: <0.01 UIU/ML (ref 0.4–4)
WBC # BLD AUTO: 5.46 K/UL (ref 3.9–12.7)

## 2023-08-29 PROCEDURE — 88175 CYTOPATH C/V AUTO FLUID REDO: CPT | Performed by: PATHOLOGY

## 2023-08-29 PROCEDURE — 3078F PR MOST RECENT DIASTOLIC BLOOD PRESSURE < 80 MM HG: ICD-10-PCS | Mod: CPTII,S$GLB,, | Performed by: FAMILY MEDICINE

## 2023-08-29 PROCEDURE — 99396 PR PREVENTIVE VISIT,EST,40-64: ICD-10-PCS | Mod: 25,S$GLB,, | Performed by: FAMILY MEDICINE

## 2023-08-29 PROCEDURE — 99396 PREV VISIT EST AGE 40-64: CPT | Mod: 25,S$GLB,, | Performed by: FAMILY MEDICINE

## 2023-08-29 PROCEDURE — 88141 PR  CYTOPATH CERV/VAG INTERPRET: ICD-10-PCS | Mod: ,,, | Performed by: PATHOLOGY

## 2023-08-29 PROCEDURE — 90714 TD VACC NO PRESV 7 YRS+ IM: CPT | Mod: S$GLB,,, | Performed by: FAMILY MEDICINE

## 2023-08-29 PROCEDURE — 3074F PR MOST RECENT SYSTOLIC BLOOD PRESSURE < 130 MM HG: ICD-10-PCS | Mod: CPTII,S$GLB,, | Performed by: FAMILY MEDICINE

## 2023-08-29 PROCEDURE — 90471 IMMUNIZATION ADMIN: CPT | Mod: S$GLB,,, | Performed by: FAMILY MEDICINE

## 2023-08-29 PROCEDURE — 36415 COLL VENOUS BLD VENIPUNCTURE: CPT | Mod: PO | Performed by: FAMILY MEDICINE

## 2023-08-29 PROCEDURE — 80053 COMPREHEN METABOLIC PANEL: CPT | Performed by: FAMILY MEDICINE

## 2023-08-29 PROCEDURE — 90471 TD VACCINE GREATER THAN OR EQUAL TO 7YO PRESERVATIVE FREE IM: ICD-10-PCS | Mod: S$GLB,,, | Performed by: FAMILY MEDICINE

## 2023-08-29 PROCEDURE — 3008F BODY MASS INDEX DOCD: CPT | Mod: CPTII,S$GLB,, | Performed by: FAMILY MEDICINE

## 2023-08-29 PROCEDURE — 3008F PR BODY MASS INDEX (BMI) DOCUMENTED: ICD-10-PCS | Mod: CPTII,S$GLB,, | Performed by: FAMILY MEDICINE

## 2023-08-29 PROCEDURE — 82728 ASSAY OF FERRITIN: CPT | Performed by: FAMILY MEDICINE

## 2023-08-29 PROCEDURE — 84439 ASSAY OF FREE THYROXINE: CPT | Performed by: FAMILY MEDICINE

## 2023-08-29 PROCEDURE — 3078F DIAST BP <80 MM HG: CPT | Mod: CPTII,S$GLB,, | Performed by: FAMILY MEDICINE

## 2023-08-29 PROCEDURE — 1159F PR MEDICATION LIST DOCUMENTED IN MEDICAL RECORD: ICD-10-PCS | Mod: CPTII,S$GLB,, | Performed by: FAMILY MEDICINE

## 2023-08-29 PROCEDURE — 88141 CYTOPATH C/V INTERPRET: CPT | Mod: ,,, | Performed by: PATHOLOGY

## 2023-08-29 PROCEDURE — 84443 ASSAY THYROID STIM HORMONE: CPT | Performed by: FAMILY MEDICINE

## 2023-08-29 PROCEDURE — 99999 PR PBB SHADOW E&M-EST. PATIENT-LVL V: CPT | Mod: PBBFAC,,, | Performed by: FAMILY MEDICINE

## 2023-08-29 PROCEDURE — 80061 LIPID PANEL: CPT | Performed by: FAMILY MEDICINE

## 2023-08-29 PROCEDURE — 99999 PR PBB SHADOW E&M-EST. PATIENT-LVL V: ICD-10-PCS | Mod: PBBFAC,,, | Performed by: FAMILY MEDICINE

## 2023-08-29 PROCEDURE — 87624 HPV HI-RISK TYP POOLED RSLT: CPT | Performed by: FAMILY MEDICINE

## 2023-08-29 PROCEDURE — 90714 TD VACCINE GREATER THAN OR EQUAL TO 7YO PRESERVATIVE FREE IM: ICD-10-PCS | Mod: S$GLB,,, | Performed by: FAMILY MEDICINE

## 2023-08-29 PROCEDURE — 3074F SYST BP LT 130 MM HG: CPT | Mod: CPTII,S$GLB,, | Performed by: FAMILY MEDICINE

## 2023-08-29 PROCEDURE — 1159F MED LIST DOCD IN RCRD: CPT | Mod: CPTII,S$GLB,, | Performed by: FAMILY MEDICINE

## 2023-08-29 PROCEDURE — 85025 COMPLETE CBC W/AUTO DIFF WBC: CPT | Performed by: FAMILY MEDICINE

## 2023-08-29 RX ORDER — FLUTICASONE FUROATE 100 UG/1
1 POWDER RESPIRATORY (INHALATION) DAILY
Qty: 30 EACH | Refills: 11 | Status: SHIPPED | OUTPATIENT
Start: 2023-08-29

## 2023-08-29 RX ORDER — AMOXICILLIN 875 MG/1
875 TABLET, FILM COATED ORAL 2 TIMES DAILY
Qty: 20 TABLET | Refills: 0 | Status: SHIPPED | OUTPATIENT
Start: 2023-08-29 | End: 2023-09-08

## 2023-08-29 RX ORDER — ALBUTEROL SULFATE 90 UG/1
AEROSOL, METERED RESPIRATORY (INHALATION)
Qty: 8.5 G | Refills: 11 | Status: SHIPPED | OUTPATIENT
Start: 2023-08-29

## 2023-08-29 RX ORDER — BENZONATATE 100 MG/1
100 CAPSULE ORAL 3 TIMES DAILY PRN
Qty: 30 CAPSULE | Refills: 0 | Status: SHIPPED | OUTPATIENT
Start: 2023-08-29 | End: 2023-09-08

## 2023-08-29 RX ORDER — MONTELUKAST SODIUM 10 MG/1
10 TABLET ORAL DAILY
Qty: 90 TABLET | Refills: 3 | Status: SHIPPED | OUTPATIENT
Start: 2023-08-29

## 2023-08-29 NOTE — PROGRESS NOTES
HISTORY OF PRESENT ILLNESS: Ms. Aquino comes in today fasting and without problems for annual wellness exam.      END OF DECISION: She does not have a living will and is not sure about life support.     Current Outpatient Medications   Medication Sig    loratadine (CLARITIN) 10 mg tablet Take 10 mg by mouth once daily.    montelukast (SINGULAIR) 10 mg tablet TAKE 1 TABLET(10 MG) BY MOUTH EVERY DAY    albuterol (PROAIR HFA) 90 mcg/actuation inhaler INHALE 2 PUFFS INTO THE LUNGS EVERY 6 HOURS AS NEEDED FOR WHEEZING    fluticasone furoate (ARNUITY ELLIPTA) 100 mcg/actuation inhaler Inhale 1 puff into the lungs once daily. Controller      SCREENINGS:   Cholesterol: August 23, 2022.   FFS/Colonoscopy: Never. Declines colonoscopy despite my discussion with her regarding importance as patient has family history of colon cancer. April 1, 2022 FIT-KIT - okay.   Mammogram: September 27, 2022 - okay.   GYN Exam: August 23, 2022 pap smear - okay; HPV - positive. September 27, 2022 colposcopy - okay. Pap smear due today.   Dexa Scan: Never.  Eye Exam: August 31, 2021 with Dr. Sahu. She wears glasses.  PPD: Negative in the past per patient.  Immunizations: Tdap - July 12, 2012. She desires.  Gardasil - N./A.  Zostavax - N./A.  Pneumovax - never. She declines.  Prevnar - never. She declines.  Seasonal Flu - N./A. She declines.  Shingrix - Never. Reports no history of varicella or zoster.   Covid-19 vaccine - Never. She declines.    ROS:  GENERAL: Denies fever, chills, fatigue or unusual weight change. Appetite normal. Weight 40.1 kg (88 lb 6.5 oz) at August 23, 2022 visit. Exercises/walks daily at work and monitors diet.  SKIN: Denies rashes, itching, changes in mole, color or texture of skin or easy bruising.  HEAD: Denies headaches or recent head trauma.  EYES: Denies change in vision, pain, diplopia, redness or discharge.   EARS: Denies ear pain, discharge, vertigo or decreased hearing.  NOSE: Denies loss of smell, epistaxis or  "rhinitis.  MOUTH & THROAT: Denies hoarseness or change in voice. Denies excessive gum bleeding or mouth sores. Denies sore throat.  NODES: Denies swollen glands.  CHEST: Denies JAIMES, wheezing, or sputum production. Saw NP Bhumika Jonas with pulmonary on August 26, 2021 for asthma surveillance with 6-month follow up with spirometry advised. Report chest congestion with greenish phlegm x 2 days and reports has not been taking Singulair x 1 month. Denies exposure to known ill contacts.  CARDIOVASCULAR: Denies chest pain, PND, orthopnea or reduced exercise tolerance. Denies palpitations.  ABDOMEN: Denies diarrhea, constipation, nausea, vomiting, abdominal pain, or blood in stool.  URINARY: Denies flank pain, dysuria or hematuria.  GENITOURINARY: Denies flank pain, dysuria, frequency or hematuria. No change in menses. Occasionally performs monthly breast self exams.   ENDOCRINE: Denies diabetes, thyroid, cholesterol problems.  HEME/LYMPH: Denies bleeding problems.   PERIPHERAL VASCULAR:Denies claudication or cyanosis.  MUSCULOSKELETAL: Denies joint stiffness, pain or swelling. Denies edema.  NEUROLOGIC: Denies history of seizures, tremors, paralysis, alteration of gait or coordination.  PSYCHIATRIC: Denies mood swings, depression, anxiety, homicidal or suicidal thoughts. Denies sleep problems.     PE:   VS: Blood Pressure 102/72   Pulse 105   Temperature 96.2 °F (35.7 °C) (Tympanic)   Respiration 18   Height 4' 11" (1.499 m)   Weight 40.5 kg (89 lb 4.6 oz)   Last Menstrual Period 09/21/2022 (Exact Date)   Oxygen Saturation 96%   Body Mass Index 18.03 kg/m²   APPEARANCE: Well nourished, well developed female, pleasant, alert and oriented in no acute distress.   HEAD: Non tender. Full range of motion.  EYES: PERRL, conjunctiva pink, lids no edema.  EARS: External canals not patent. No swelling or redness. TM's not visualized due to impacted cerumen.   NOSE: Mucosa and turbinates pink, not swollen. No discharge. Non " tender sinuses.  THROAT: No pharyngeal erythema or exudate. No stridor.   NECK: Supple, no mass, thyroid not enlarged.  NODES: No cervical, axillary or inguinal lymph node enlargement.  CHEST: Normal respiratory effort. Lungs clear to auscultation.  CARDIOVASCULAR: Normal S1, S2. No rubs, murmurs or gallops. PMI not displaced. No carotid bruit. Pedal pulses palpable bilaterally. No edema.  ABDOMEN: Bowel sounds present. Not distended. Soft. No tenderness, masses or organomegaly.  BREAST EXAM: Symmetrical, no external lesions, no discharge, no masses palpated.  PELVIC EXAM: No external lesions noted, no discharge, cervix appears normal, bimanual exam showed no uterine abnormalities and no adnexal masses. Urethra and bladder intact.  RECTAL EXAM: No external hemorrhoids or anal fissures but non-inflamed hemorrhoidal tag noted. Heme negative stool in rectal vault.   MUSCULOSKELETAL: No joint deformities or stiffness. She is ambulatory without problems.  SKIN: No rashes or suspicious lesions, normal color and turgor.  NEUROLOGIC: Cranial Nerves: II-XII grossly intact. DTR's: Knees, Ankles 2+ and equal bilaterally. Gait & Posture: Normal gait and fine motion.  PSYCHIATRIC: Patient alert, oriented x 3. Mood/Affect normal without acute anxiety or depression noted. Judgment/insight good as she makes appropriate decisions on today's examination.     ASSESSMENT:    ICD-10-CM ICD-9-CM    1. Annual physical exam  Z00.00 V70.0 CBC Auto Differential      TSH      Comprehensive Metabolic Panel      Lipid Panel      Ferritin      Liquid-Based Pap Smear, Screening      HPV High Risk Genotypes, PCR      2. Iron deficiency anemia, unspecified iron deficiency anemia type  D50.9 280.9       3. Mild persistent asthma without complication  J45.30 493.90 montelukast (SINGULAIR) 10 mg tablet      fluticasone furoate (ARNUITY ELLIPTA) 100 mcg/actuation inhaler      albuterol (PROAIR HFA) 90 mcg/actuation inhaler      4. Acute bronchitis,  unspecified organism  J20.9 466.0 amoxicillin (AMOXIL) 875 MG tablet      benzonatate (TESSALON) 100 MG capsule      5. Other screening mammogram  Z12.31 V76.12 Mammo Digital Screening Bilat w/ John      6. Need for Td vaccine  Z23 V06.5 (In Office Administered) Td Vaccine - Preservative Free          PLAN:  1. Age-appropriate counseling-appropriate low-sodium, low-cholesterol, low carbohydrate diet and exercise daily, monthly breast self exam, annual wellness examination.   2. Patient advised to call for results.  3. Continue current medications.  4. Prescription refills as noted above.  5. Add Amoxicillin 875 mg twice daily x 10 days and Tessalon Perles 100 mg every 8 hours prn cough; medication(s) precautions discuss with patient.  6. Keep follow up with specialists.  7. Follow up in about 53 weeks (around 9/3/2024) for physical.

## 2023-08-30 ENCOUNTER — PATIENT MESSAGE (OUTPATIENT)
Dept: ADMINISTRATIVE | Facility: HOSPITAL | Age: 53
End: 2023-08-30
Payer: COMMERCIAL

## 2023-08-30 DIAGNOSIS — Z12.11 SCREENING FOR COLON CANCER: ICD-10-CM

## 2023-09-02 LAB
HPV HR 12 DNA SPEC QL NAA+PROBE: NEGATIVE
HPV16 AG SPEC QL: NEGATIVE
HPV18 DNA SPEC QL NAA+PROBE: NEGATIVE

## 2023-09-07 LAB
FINAL PATHOLOGIC DIAGNOSIS: ABNORMAL
Lab: ABNORMAL

## 2023-09-25 ENCOUNTER — TELEPHONE (OUTPATIENT)
Dept: FAMILY MEDICINE | Facility: CLINIC | Age: 53
End: 2023-09-25
Payer: COMMERCIAL

## 2023-09-25 DIAGNOSIS — R87.612 LGSIL ON PAP SMEAR OF CERVIX: Primary | ICD-10-CM

## 2023-09-25 DIAGNOSIS — R79.89 ABNORMAL TSH: ICD-10-CM

## 2023-09-25 NOTE — TELEPHONE ENCOUNTER
Tell pt lab results are within acceptable range except:  1 - abnormal thyroid level; need thyroid scan for further evaluation. Will need f/u appt w/me to discuss results.  2 - borderline high liver enzyme; will monitor for now.  3 - HPV okay but pap smear again abnormal; needs f/u appt w/ NP Shwetha Ceballos.    I have put the following orders and/or medications to this note.  Please advise pt and assist.    Orders Placed This Encounter   Procedures    NM Thyroid Uptake and Scan     Standing Status:   Future     Standing Expiration Date:   9/25/2024     Order Specific Question:   May the Radiologist modify the order per protocol to meet the clinical needs of the patient?     Answer:   Yes     Order Specific Question:   Release to patient     Answer:   Immediate    Ambulatory referral/consult to Gynecology     Standing Status:   Future     Standing Expiration Date:   10/25/2024     Referral Priority:   Routine     Referral Type:   Consultation     Referral Reason:   Specialty Services Required     Requested Specialty:   Gynecology     Number of Visits Requested:   1

## 2023-09-26 ENCOUNTER — TELEPHONE (OUTPATIENT)
Dept: FAMILY MEDICINE | Facility: CLINIC | Age: 53
End: 2023-09-26
Payer: COMMERCIAL

## 2023-10-03 ENCOUNTER — OFFICE VISIT (OUTPATIENT)
Dept: OBSTETRICS AND GYNECOLOGY | Facility: CLINIC | Age: 53
End: 2023-10-03
Payer: COMMERCIAL

## 2023-10-03 ENCOUNTER — LAB VISIT (OUTPATIENT)
Dept: LAB | Facility: HOSPITAL | Age: 53
End: 2023-10-03
Attending: NURSE PRACTITIONER
Payer: COMMERCIAL

## 2023-10-03 VITALS
WEIGHT: 87.5 LBS | BODY MASS INDEX: 17.64 KG/M2 | HEIGHT: 59 IN | SYSTOLIC BLOOD PRESSURE: 106 MMHG | DIASTOLIC BLOOD PRESSURE: 76 MMHG

## 2023-10-03 DIAGNOSIS — N95.0 POSTMENOPAUSAL BLEEDING: ICD-10-CM

## 2023-10-03 DIAGNOSIS — R87.612 LGSIL ON PAP SMEAR OF CERVIX: Primary | ICD-10-CM

## 2023-10-03 LAB — FSH SERPL-ACNC: 7.31 MIU/ML

## 2023-10-03 PROCEDURE — 3074F SYST BP LT 130 MM HG: CPT | Mod: CPTII,S$GLB,, | Performed by: NURSE PRACTITIONER

## 2023-10-03 PROCEDURE — 3078F DIAST BP <80 MM HG: CPT | Mod: CPTII,S$GLB,, | Performed by: NURSE PRACTITIONER

## 2023-10-03 PROCEDURE — 3008F BODY MASS INDEX DOCD: CPT | Mod: CPTII,S$GLB,, | Performed by: NURSE PRACTITIONER

## 2023-10-03 PROCEDURE — 3074F PR MOST RECENT SYSTOLIC BLOOD PRESSURE < 130 MM HG: ICD-10-PCS | Mod: CPTII,S$GLB,, | Performed by: NURSE PRACTITIONER

## 2023-10-03 PROCEDURE — 1159F PR MEDICATION LIST DOCUMENTED IN MEDICAL RECORD: ICD-10-PCS | Mod: CPTII,S$GLB,, | Performed by: NURSE PRACTITIONER

## 2023-10-03 PROCEDURE — 36415 COLL VENOUS BLD VENIPUNCTURE: CPT | Performed by: NURSE PRACTITIONER

## 2023-10-03 PROCEDURE — 1160F PR REVIEW ALL MEDS BY PRESCRIBER/CLIN PHARMACIST DOCUMENTED: ICD-10-PCS | Mod: CPTII,S$GLB,, | Performed by: NURSE PRACTITIONER

## 2023-10-03 PROCEDURE — 3078F PR MOST RECENT DIASTOLIC BLOOD PRESSURE < 80 MM HG: ICD-10-PCS | Mod: CPTII,S$GLB,, | Performed by: NURSE PRACTITIONER

## 2023-10-03 PROCEDURE — 1160F RVW MEDS BY RX/DR IN RCRD: CPT | Mod: CPTII,S$GLB,, | Performed by: NURSE PRACTITIONER

## 2023-10-03 PROCEDURE — 1159F MED LIST DOCD IN RCRD: CPT | Mod: CPTII,S$GLB,, | Performed by: NURSE PRACTITIONER

## 2023-10-03 PROCEDURE — 99212 OFFICE O/P EST SF 10 MIN: CPT | Mod: S$GLB,,, | Performed by: NURSE PRACTITIONER

## 2023-10-03 PROCEDURE — 99212 PR OFFICE/OUTPT VISIT, EST, LEVL II, 10-19 MIN: ICD-10-PCS | Mod: S$GLB,,, | Performed by: NURSE PRACTITIONER

## 2023-10-03 PROCEDURE — 99999 PR PBB SHADOW E&M-EST. PATIENT-LVL III: ICD-10-PCS | Mod: PBBFAC,,, | Performed by: NURSE PRACTITIONER

## 2023-10-03 PROCEDURE — 99999 PR PBB SHADOW E&M-EST. PATIENT-LVL III: CPT | Mod: PBBFAC,,, | Performed by: NURSE PRACTITIONER

## 2023-10-03 PROCEDURE — 3008F PR BODY MASS INDEX (BMI) DOCUMENTED: ICD-10-PCS | Mod: CPTII,S$GLB,, | Performed by: NURSE PRACTITIONER

## 2023-10-03 PROCEDURE — 83001 ASSAY OF GONADOTROPIN (FSH): CPT | Performed by: NURSE PRACTITIONER

## 2023-10-03 NOTE — PROGRESS NOTES
Subjective:       Patient ID: Ernesto Aquino is a 53 y.o. female.    Chief Complaint:  Abnormal Pap Smear    Patient's last menstrual period was 2022 (exact date).  History of Present Illness  Presents today to schedule colposcopy   Has not had a cycle in over 12 months and then she started bleeding 2024 lasting 3-4 days just like a regular cycle     OB History    Para Term  AB Living   0 0 0         SAB IAB Ectopic Multiple Live Births                   Review of Systems  Review of Systems        Objective:    Physical Exam      Assessment:     1. LGSIL on Pap smear of cervix    2. Postmenopausal bleeding              Plan:   Ernesto was seen today for abnormal pap smear.    Diagnoses and all orders for this visit:    LGSIL on Pap smear of cervix    Postmenopausal bleeding  -     US Pelvis Complete Non OB; Future  -     FOLLICLE STIMULATING HORMONE; Future    Please schedule all pap smear with me until pap smear resutls are negative   Return to clinic for colposcopy/physical exam and colposcopy   Informed client that postmenopausal bleeding may be due to abnormal thyroid

## 2023-10-06 LAB — HEMOCCULT STL QL IA: NEGATIVE

## 2023-10-11 ENCOUNTER — HOSPITAL ENCOUNTER (OUTPATIENT)
Dept: RADIOLOGY | Facility: HOSPITAL | Age: 53
Discharge: HOME | End: 2023-10-11
Attending: FAMILY MEDICINE
Payer: COMMERCIAL

## 2023-10-11 DIAGNOSIS — R79.89 ABNORMAL TSH: ICD-10-CM

## 2023-10-11 PROCEDURE — 78014 NM THYROID UPTAKE AND SCAN: ICD-10-PCS | Mod: 26,,, | Performed by: RADIOLOGY

## 2023-10-11 PROCEDURE — 78014 THYROID IMAGING W/BLOOD FLOW: CPT | Mod: 26,,, | Performed by: RADIOLOGY

## 2023-10-11 PROCEDURE — A9516 IODINE I-123 SOD IODIDE MIC: HCPCS

## 2023-10-12 ENCOUNTER — HOSPITAL ENCOUNTER (OUTPATIENT)
Dept: RADIOLOGY | Facility: HOSPITAL | Age: 53
Discharge: HOME | End: 2023-10-12
Attending: FAMILY MEDICINE
Payer: COMMERCIAL

## 2023-10-17 ENCOUNTER — TELEPHONE (OUTPATIENT)
Dept: FAMILY MEDICINE | Facility: CLINIC | Age: 53
End: 2023-10-17
Payer: COMMERCIAL

## 2023-10-17 ENCOUNTER — HOSPITAL ENCOUNTER (OUTPATIENT)
Dept: RADIOLOGY | Facility: HOSPITAL | Age: 53
Discharge: HOME | End: 2023-10-17
Attending: FAMILY MEDICINE
Payer: COMMERCIAL

## 2023-10-17 VITALS — HEIGHT: 59 IN | WEIGHT: 87.5 LBS | BODY MASS INDEX: 17.64 KG/M2

## 2023-10-17 DIAGNOSIS — Z12.31 OTHER SCREENING MAMMOGRAM: ICD-10-CM

## 2023-10-17 PROCEDURE — 77067 MAMMO DIGITAL SCREENING BILAT WITH TOMO: ICD-10-PCS | Mod: 26,,, | Performed by: RADIOLOGY

## 2023-10-17 PROCEDURE — 77067 SCR MAMMO BI INCL CAD: CPT | Mod: TC

## 2023-10-17 PROCEDURE — 77063 MAMMO DIGITAL SCREENING BILAT WITH TOMO: ICD-10-PCS | Mod: 26,,, | Performed by: RADIOLOGY

## 2023-10-17 PROCEDURE — 77067 SCR MAMMO BI INCL CAD: CPT | Mod: 26,,, | Performed by: RADIOLOGY

## 2023-10-17 PROCEDURE — 77063 BREAST TOMOSYNTHESIS BI: CPT | Mod: 26,,, | Performed by: RADIOLOGY

## 2023-10-17 NOTE — TELEPHONE ENCOUNTER
Advise pt thyroid scan shows she has overactive thyroid. I recommend she see/consult with [facility] for further evaluation.  However, would likely be virtual visit. Is this okay with her?

## 2023-10-24 ENCOUNTER — PROCEDURE VISIT (OUTPATIENT)
Dept: OBSTETRICS AND GYNECOLOGY | Facility: CLINIC | Age: 53
End: 2023-10-24
Payer: COMMERCIAL

## 2023-10-24 ENCOUNTER — HOSPITAL ENCOUNTER (OUTPATIENT)
Dept: RADIOLOGY | Facility: HOSPITAL | Age: 53
Discharge: HOME | End: 2023-10-24
Attending: NURSE PRACTITIONER
Payer: COMMERCIAL

## 2023-10-24 VITALS
DIASTOLIC BLOOD PRESSURE: 68 MMHG | BODY MASS INDEX: 17.87 KG/M2 | HEIGHT: 59 IN | WEIGHT: 88.63 LBS | SYSTOLIC BLOOD PRESSURE: 108 MMHG

## 2023-10-24 DIAGNOSIS — R87.612 LGSIL ON PAP SMEAR OF CERVIX: Primary | ICD-10-CM

## 2023-10-24 DIAGNOSIS — N95.0 POSTMENOPAUSAL BLEEDING: ICD-10-CM

## 2023-10-24 PROCEDURE — 88342 IMHCHEM/IMCYTCHM 1ST ANTB: CPT | Performed by: PATHOLOGY

## 2023-10-24 PROCEDURE — 88342 CHG IMMUNOCYTOCHEMISTRY: ICD-10-PCS | Mod: 26,,, | Performed by: PATHOLOGY

## 2023-10-24 PROCEDURE — 76856 US PELVIS COMP WITH TRANSVAG NON-OB (XPD): ICD-10-PCS | Mod: 26,,, | Performed by: RADIOLOGY

## 2023-10-24 PROCEDURE — 88305 TISSUE EXAM BY PATHOLOGIST: CPT | Performed by: PATHOLOGY

## 2023-10-24 PROCEDURE — 88342 IMHCHEM/IMCYTCHM 1ST ANTB: CPT | Mod: 26,,, | Performed by: PATHOLOGY

## 2023-10-24 PROCEDURE — 76830 TRANSVAGINAL US NON-OB: CPT | Mod: TC

## 2023-10-24 PROCEDURE — 57456 COLPOSCOPY: ICD-10-PCS | Mod: S$GLB,,, | Performed by: NURSE PRACTITIONER

## 2023-10-24 PROCEDURE — 76856 US EXAM PELVIC COMPLETE: CPT | Mod: 26,,, | Performed by: RADIOLOGY

## 2023-10-24 PROCEDURE — 88305 TISSUE EXAM BY PATHOLOGIST: ICD-10-PCS | Mod: 26,,, | Performed by: PATHOLOGY

## 2023-10-24 PROCEDURE — 57456 ENDOCERV CURETTAGE W/SCOPE: CPT | Mod: S$GLB,,, | Performed by: NURSE PRACTITIONER

## 2023-10-24 PROCEDURE — 76830 US PELVIS COMP WITH TRANSVAG NON-OB (XPD): ICD-10-PCS | Mod: 26,,, | Performed by: RADIOLOGY

## 2023-10-24 PROCEDURE — 88305 TISSUE EXAM BY PATHOLOGIST: CPT | Mod: 26,,, | Performed by: PATHOLOGY

## 2023-10-24 PROCEDURE — 76830 TRANSVAGINAL US NON-OB: CPT | Mod: 26,,, | Performed by: RADIOLOGY

## 2023-10-24 NOTE — PROCEDURES
Colposcopy    Date/Time: 10/24/2023 9:30 AM    Performed by: Shwetha Ceballos NP  Authorized by: Shwetha Ceballos NP    Consent Done?:  Yes (Verbal) and Yes (Written)  Timeout:Immediately prior to procedure a time out was called to verify the correct patient, procedure, equipment, support staff and site/side marked as required  Prep:Patient was prepped and draped in the usual sterile fashion  Assistants?: Yes    List of assistants:  Octavio KRISHNAN was present for the entire procedure.    Colposcopy Site:  Cervix  Position:  Supine   Patient was prepped and draped in the normal sterile fashion.  Acrowhite Lesion: No    Atypical Vessels: No    Transformation Zone Adequate?: No    Biopsy?: No    ECC Performed?: Yes    LEEP Performed?: No    Estimated blood loss (cc):  5   Patient tolerated the procedure well with no immediate complications.   Post-operative instructions were provided for the patient.   Patient was discharged and will follow up if any complications occur     Impression Negative

## 2023-10-26 DIAGNOSIS — N83.201 RIGHT OVARIAN CYST: Primary | ICD-10-CM

## 2023-10-30 ENCOUNTER — TELEPHONE (OUTPATIENT)
Dept: OBSTETRICS AND GYNECOLOGY | Facility: CLINIC | Age: 53
End: 2023-10-30
Payer: COMMERCIAL

## 2023-10-30 NOTE — TELEPHONE ENCOUNTER
----- Message from Shwetha Ceballos NP sent at 10/26/2023  4:39 PM CDT -----  Pelvic ultrasound reveals   Complex right ovarian cyst.  Repeat pelvic ultrasound in 8 weeks to monitor right ovarian cyst and a  (lab)   Orders written

## 2023-10-31 LAB
COMMENT: NORMAL
FINAL PATHOLOGIC DIAGNOSIS: NORMAL
GROSS: NORMAL
Lab: NORMAL

## 2023-12-19 ENCOUNTER — HOSPITAL ENCOUNTER (OUTPATIENT)
Dept: RADIOLOGY | Facility: HOSPITAL | Age: 53
Discharge: HOME | End: 2023-12-19
Attending: NURSE PRACTITIONER
Payer: COMMERCIAL

## 2023-12-19 DIAGNOSIS — N83.201 RIGHT OVARIAN CYST: ICD-10-CM

## 2023-12-19 PROCEDURE — 76856 US PELVIS COMP WITH TRANSVAG NON-OB (XPD): ICD-10-PCS | Mod: 26,,, | Performed by: RADIOLOGY

## 2023-12-19 PROCEDURE — 76830 TRANSVAGINAL US NON-OB: CPT | Mod: TC

## 2023-12-19 PROCEDURE — 76830 TRANSVAGINAL US NON-OB: CPT | Mod: 26,,, | Performed by: RADIOLOGY

## 2023-12-19 PROCEDURE — 76830 US PELVIS COMP WITH TRANSVAG NON-OB (XPD): ICD-10-PCS | Mod: 26,,, | Performed by: RADIOLOGY

## 2023-12-19 PROCEDURE — 76856 US EXAM PELVIC COMPLETE: CPT | Mod: 26,,, | Performed by: RADIOLOGY

## 2023-12-22 ENCOUNTER — PATIENT MESSAGE (OUTPATIENT)
Dept: OBSTETRICS AND GYNECOLOGY | Facility: CLINIC | Age: 53
End: 2023-12-22
Payer: COMMERCIAL

## 2024-04-03 ENCOUNTER — PATIENT MESSAGE (OUTPATIENT)
Dept: PULMONOLOGY | Facility: CLINIC | Age: 54
End: 2024-04-03
Payer: COMMERCIAL

## 2024-04-17 ENCOUNTER — OFFICE VISIT (OUTPATIENT)
Dept: INTERNAL MEDICINE | Facility: CLINIC | Age: 54
End: 2024-04-17
Payer: COMMERCIAL

## 2024-04-17 VITALS
BODY MASS INDEX: 16.12 KG/M2 | DIASTOLIC BLOOD PRESSURE: 78 MMHG | HEART RATE: 115 BPM | TEMPERATURE: 97 F | OXYGEN SATURATION: 98 % | WEIGHT: 79.81 LBS | SYSTOLIC BLOOD PRESSURE: 102 MMHG | RESPIRATION RATE: 16 BRPM

## 2024-04-17 DIAGNOSIS — J18.9 PNEUMONIA DUE TO INFECTIOUS ORGANISM, UNSPECIFIED LATERALITY, UNSPECIFIED PART OF LUNG: Primary | ICD-10-CM

## 2024-04-17 DIAGNOSIS — J45.30 MILD PERSISTENT ASTHMA WITHOUT COMPLICATION: Chronic | ICD-10-CM

## 2024-04-17 DIAGNOSIS — R11.0 NAUSEA: ICD-10-CM

## 2024-04-17 PROCEDURE — 99999 PR PBB SHADOW E&M-EST. PATIENT-LVL IV: CPT | Mod: PBBFAC,,, | Performed by: NURSE PRACTITIONER

## 2024-04-17 PROCEDURE — 99214 OFFICE O/P EST MOD 30 MIN: CPT | Mod: S$GLB,,, | Performed by: NURSE PRACTITIONER

## 2024-04-17 PROCEDURE — 3074F SYST BP LT 130 MM HG: CPT | Mod: CPTII,S$GLB,, | Performed by: NURSE PRACTITIONER

## 2024-04-17 PROCEDURE — 3008F BODY MASS INDEX DOCD: CPT | Mod: CPTII,S$GLB,, | Performed by: NURSE PRACTITIONER

## 2024-04-17 PROCEDURE — 3078F DIAST BP <80 MM HG: CPT | Mod: CPTII,S$GLB,, | Performed by: NURSE PRACTITIONER

## 2024-04-17 PROCEDURE — 1159F MED LIST DOCD IN RCRD: CPT | Mod: CPTII,S$GLB,, | Performed by: NURSE PRACTITIONER

## 2024-04-17 RX ORDER — ONDANSETRON 4 MG/1
4 TABLET, ORALLY DISINTEGRATING ORAL EVERY 12 HOURS PRN
Qty: 20 TABLET | Refills: 0 | Status: SHIPPED | OUTPATIENT
Start: 2024-04-17

## 2024-04-17 RX ORDER — ONDANSETRON 4 MG/1
4 TABLET, ORALLY DISINTEGRATING ORAL EVERY 12 HOURS PRN
Qty: 20 TABLET | Refills: 0 | Status: SHIPPED | OUTPATIENT
Start: 2024-04-17 | End: 2024-04-17 | Stop reason: SDUPTHER

## 2024-04-17 RX ORDER — DOXYCYCLINE 100 MG/1
100 CAPSULE ORAL 2 TIMES DAILY
COMMUNITY
Start: 2024-04-15

## 2024-04-17 NOTE — PROGRESS NOTES
Subjective:       Patient ID: Ernesto Aquino is a 53 y.o. female.    Chief Complaint: Follow-up (ER follow up. Pt passed out at work. Pt went to [facility].) and Shortness of Breath    HPI    Pt reports that she passed out at work a couple days ago. Seen in [facility] ER. Dxed with pneumonia. She has a hx of pneumonia. States it has been flaring up a lot lately  She was started on doxy but has not been able to take it due to nausea. Reports generalized weakness    Past Medical History:   Diagnosis Date    Allergic rhinitis, cause unspecified     Asthma     QUINTEN (iron deficiency anemia) 3/2015     Past Surgical History:   Procedure Laterality Date    None       Social History     Socioeconomic History    Marital status: Single    Number of children: 0   Occupational History    Occupation: Sales     Employer: [employer]     Comment: [employer]   Tobacco Use    Smoking status: Never    Smokeless tobacco: Never   Substance and Sexual Activity    Alcohol use: No     Alcohol/week: 0.0 standard drinks of alcohol    Drug use: No    Sexual activity: Yes     Partners: Male     Birth control/protection: Condom     Comment: 1 partner   Social History Narrative    She wears seatbelt.     Social Determinants of Health     Physical Activity: Sufficiently Active (8/29/2023)    Exercise Vital Sign     Days of Exercise per Week: 6 days     Minutes of Exercise per Session: 150+ min     Review of patient's allergies indicates:  No Known Allergies  Current Outpatient Medications   Medication Sig Dispense Refill    albuterol (PROAIR HFA) 90 mcg/actuation inhaler INHALE 2 PUFFS INTO THE LUNGS EVERY 6 HOURS AS NEEDED FOR WHEEZING 8.5 g 11    doxycycline (VIBRAMYCIN) 100 MG Cap Take 100 mg by mouth 2 (two) times daily.      fluticasone furoate (ARNUITY ELLIPTA) 100 mcg/actuation inhaler Inhale 1 puff into the lungs once daily. Controller 30 each 11    loratadine (CLARITIN) 10 mg tablet Take 10 mg by mouth once daily.       montelukast (SINGULAIR) 10 mg tablet Take 1 tablet (10 mg total) by mouth once daily. 90 tablet 3    ondansetron (ZOFRAN-ODT) 4 MG TbDL Take 1 tablet (4 mg total) by mouth every 12 (twelve) hours as needed (nausea). 20 tablet 0     No current facility-administered medications for this visit.           Review of Systems   Constitutional:  Positive for fatigue. Negative for activity change, appetite change, chills, diaphoresis, fever and unexpected weight change.   HENT:  Negative for congestion, ear pain, postnasal drip, rhinorrhea, sinus pressure, sinus pain, sneezing, sore throat, tinnitus, trouble swallowing and voice change.    Eyes:  Negative for photophobia, pain and visual disturbance.   Respiratory:  Positive for shortness of breath. Negative for cough, chest tightness and wheezing.    Cardiovascular:  Negative for chest pain, palpitations and leg swelling.   Gastrointestinal:  Negative for abdominal distention, abdominal pain, constipation, diarrhea, nausea and vomiting.   Genitourinary:  Negative for decreased urine volume, difficulty urinating, dysuria, flank pain, frequency, hematuria and urgency.   Musculoskeletal:  Negative for arthralgias, back pain, joint swelling, neck pain and neck stiffness.   Allergic/Immunologic: Negative for immunocompromised state.   Neurological:  Positive for weakness. Negative for dizziness, tremors, seizures, syncope, facial asymmetry, speech difficulty, light-headedness, numbness and headaches.   Hematological:  Negative for adenopathy. Does not bruise/bleed easily.   Psychiatric/Behavioral:  Negative for confusion and sleep disturbance.        Objective:      Physical Exam  Vitals reviewed.   Cardiovascular:      Rate and Rhythm: Normal rate and regular rhythm.      Pulses: Normal pulses.      Heart sounds: Normal heart sounds.   Pulmonary:      Effort: Pulmonary effort is normal.      Comments: Diminished lung sounds throughout   Musculoskeletal:      Comments: Gen  weakness   Neurological:      Mental Status: She is alert.         Assessment:     Vitals:    04/17/24 0935   BP: 102/78   Pulse: (!) 115   Resp: 16   Temp: 96.9 °F (36.1 °C)         1. Pneumonia due to infectious organism, unspecified laterality, unspecified part of lung    2. Nausea    3. Mild persistent asthma without complication        Plan:   Pneumonia due to infectious organism, unspecified laterality, unspecified part of lung  -     X-Ray Chest PA And Lateral; Future; Expected date: 05/01/2024  -     Ambulatory referral/consult to Pulmonology; Future; Expected date: 04/24/2024    Nausea  -     ondansetron (ZOFRAN-ODT) 4 MG TbDL; Take 1 tablet (4 mg total) by mouth every 12 (twelve) hours as needed (nausea).  Dispense: 20 tablet; Refill: 0    Mild persistent asthma without complication  -     Ambulatory referral/consult to Pulmonology; Future; Expected date: 04/24/2024          Cxr 10 days  Take zofran , wait 30 min eat light meal/snack then take antibiotic  Work excuse  See pulm

## 2024-04-17 NOTE — LETTER
April 17, 2024      [facility] [address]  [address] [facility]  [place] [address]  Phone: [phone]  Fax: [phone]       Patient: Ernesto Aquino   YOB: 1970  Date of Visit: 04/17/2024    To Whom It May Concern:    Richard Aquino  was at [facility] on 04/17/2024. The patient may return to work/school on 04/22/2024 with no restrictions. If you have any questions or concerns, or if I can be of further assistance, please do not hesitate to contact me.    Sincerely,  EvinNP

## 2024-04-29 ENCOUNTER — HOSPITAL ENCOUNTER (OUTPATIENT)
Dept: RADIOLOGY | Facility: HOSPITAL | Age: 54
Discharge: HOME | End: 2024-04-29
Attending: NURSE PRACTITIONER
Payer: COMMERCIAL

## 2024-04-29 DIAGNOSIS — J18.9 PNEUMONIA DUE TO INFECTIOUS ORGANISM, UNSPECIFIED LATERALITY, UNSPECIFIED PART OF LUNG: ICD-10-CM

## 2024-04-29 PROCEDURE — 71046 X-RAY EXAM CHEST 2 VIEWS: CPT | Mod: TC

## 2024-04-29 PROCEDURE — 71046 X-RAY EXAM CHEST 2 VIEWS: CPT | Mod: 26,,, | Performed by: RADIOLOGY

## 2024-05-08 ENCOUNTER — TELEPHONE (OUTPATIENT)
Dept: PULMONOLOGY | Facility: CLINIC | Age: 54
End: 2024-05-08

## 2024-05-08 ENCOUNTER — OFFICE VISIT (OUTPATIENT)
Dept: PULMONOLOGY | Facility: CLINIC | Age: 54
End: 2024-05-08
Payer: COMMERCIAL

## 2024-05-08 VITALS
DIASTOLIC BLOOD PRESSURE: 72 MMHG | SYSTOLIC BLOOD PRESSURE: 108 MMHG | HEART RATE: 100 BPM | RESPIRATION RATE: 16 BRPM | WEIGHT: 79.56 LBS | BODY MASS INDEX: 16.04 KG/M2 | OXYGEN SATURATION: 91 % | HEIGHT: 59 IN

## 2024-05-08 DIAGNOSIS — K21.9 GASTROESOPHAGEAL REFLUX DISEASE WITHOUT ESOPHAGITIS: Primary | ICD-10-CM

## 2024-05-08 DIAGNOSIS — J18.9 PNEUMONIA DUE TO INFECTIOUS ORGANISM, UNSPECIFIED LATERALITY, UNSPECIFIED PART OF LUNG: ICD-10-CM

## 2024-05-08 DIAGNOSIS — J45.30 MILD PERSISTENT ASTHMA WITHOUT COMPLICATION: Chronic | ICD-10-CM

## 2024-05-08 PROCEDURE — 99213 OFFICE O/P EST LOW 20 MIN: CPT | Mod: S$GLB,,, | Performed by: INTERNAL MEDICINE

## 2024-05-08 PROCEDURE — 1159F MED LIST DOCD IN RCRD: CPT | Mod: CPTII,S$GLB,, | Performed by: INTERNAL MEDICINE

## 2024-05-08 PROCEDURE — 99999 PR PBB SHADOW E&M-EST. PATIENT-LVL III: CPT | Mod: PBBFAC,,, | Performed by: INTERNAL MEDICINE

## 2024-05-08 PROCEDURE — 1160F RVW MEDS BY RX/DR IN RCRD: CPT | Mod: CPTII,S$GLB,, | Performed by: INTERNAL MEDICINE

## 2024-05-08 PROCEDURE — 3008F BODY MASS INDEX DOCD: CPT | Mod: CPTII,S$GLB,, | Performed by: INTERNAL MEDICINE

## 2024-05-08 PROCEDURE — 3078F DIAST BP <80 MM HG: CPT | Mod: CPTII,S$GLB,, | Performed by: INTERNAL MEDICINE

## 2024-05-08 PROCEDURE — 3074F SYST BP LT 130 MM HG: CPT | Mod: CPTII,S$GLB,, | Performed by: INTERNAL MEDICINE

## 2024-05-08 RX ORDER — PANTOPRAZOLE SODIUM 40 MG/1
40 TABLET, DELAYED RELEASE ORAL DAILY
Qty: 90 TABLET | Refills: 3 | Status: SHIPPED | OUTPATIENT
Start: 2024-05-08 | End: 2024-06-04 | Stop reason: SDUPTHER

## 2024-05-08 RX ORDER — FLUTICASONE PROPIONATE AND SALMETEROL 250; 50 UG/1; UG/1
1 POWDER RESPIRATORY (INHALATION) 2 TIMES DAILY
Qty: 60 EACH | Refills: 6 | Status: SHIPPED | OUTPATIENT
Start: 2024-05-08 | End: 2024-05-22 | Stop reason: SDUPTHER

## 2024-05-08 RX ORDER — FLUTICASONE PROPIONATE AND SALMETEROL 250; 50 UG/1; UG/1
1 POWDER RESPIRATORY (INHALATION) 2 TIMES DAILY
Qty: 60 EACH | Refills: 6 | Status: SHIPPED | OUTPATIENT
Start: 2024-05-08 | End: 2024-05-08 | Stop reason: SDUPTHER

## 2024-05-08 RX ORDER — PANTOPRAZOLE SODIUM 40 MG/1
40 TABLET, DELAYED RELEASE ORAL DAILY
Qty: 90 TABLET | Refills: 3 | Status: SHIPPED | OUTPATIENT
Start: 2024-05-08 | End: 2024-05-08 | Stop reason: SDUPTHER

## 2024-05-08 NOTE — PROGRESS NOTES
"Subjective:      Patient ID: Ernesto Aquino is a 53 y.o. female.    Chief Complaint: Asthma    Asthma  Her past medical history is significant for asthma.       53-year-old with longstanding asthma managed with Arnuity once daily and Singulair at bedtime with p.r.n. albuterol.  Asthma symptoms of late have been worsening.  She was treated for presumed pneumonia/bronchitis in April but continues to have persistent cough and dyspnea in the daytime also worsening cough at night with some reflux symptoms.  No fever or chills.  No night sweats.    Review of Systems as per history of present illness otherwise negative  Objective:     Physical Exam   Constitutional: She is oriented to person, place, and time. She appears well-developed. No distress.   HENT:   Head: Normocephalic.   Cardiovascular: Normal rate and regular rhythm.   Pulmonary/Chest: Normal expansion, symmetric chest wall expansion and effort normal. She has no wheezes. She has rales.   Musculoskeletal:      Cervical back: Neck supple.   Neurological: She is alert and oriented to person, place, and time.   Psychiatric: She has a normal mood and affect.   Nursing note and vitals reviewed.          5/8/2024    10:23 AM 4/17/2024     9:35 AM 10/24/2023     8:56 AM 10/17/2023     8:51 AM 10/3/2023     2:09 PM 8/29/2023     8:23 AM 9/27/2022     9:21 AM   Pulmonary Function Tests   SpO2 91 % 98 %    96 %    Height 4' 11" (1.499 m)  4' 11" (1.499 m) 4' 11" (1.499 m) 4' 11" (1.499 m) 4' 11" (1.499 m) 4' 11" (1.499 m)   Weight 36.1 kg (79 lb 9.4 oz) 36.2 kg (79 lb 12.9 oz) 40.2 kg (88 lb 10 oz) 39.7 kg (87 lb 8.4 oz) 39.7 kg (87 lb 8.4 oz) 40.5 kg (89 lb 4.6 oz) 41.5 kg (91 lb 7.9 oz)   BMI (Calculated) 16.1  17.9 17.7 17.7 18 18.5        Assessment:     1. Gastroesophageal reflux disease without esophagitis    2. Pneumonia due to infectious organism, unspecified laterality, unspecified part of lung    3. Mild persistent asthma without complication      I personally " reviewed chest radiograph images from April.  This shows normal chest x-ray      Plan:     Worsening asthma, likely triggered by nighttime reflux  We will step up to Advair 250 b.i.d.  Add Protonix q.a.m. and Pepcid q.h.s. for reflux symptoms  Return in 4 weeks for close follow-up sooner if needed

## 2024-05-22 DIAGNOSIS — J45.30 MILD PERSISTENT ASTHMA WITHOUT COMPLICATION: ICD-10-CM

## 2024-05-22 RX ORDER — FLUTICASONE PROPIONATE AND SALMETEROL 250; 50 UG/1; UG/1
1 POWDER RESPIRATORY (INHALATION) 2 TIMES DAILY
Qty: 60 EACH | Refills: 6 | Status: SHIPPED | OUTPATIENT
Start: 2024-05-22 | End: 2024-06-04 | Stop reason: SDUPTHER

## 2024-05-22 RX ORDER — ALBUTEROL SULFATE 90 UG/1
AEROSOL, METERED RESPIRATORY (INHALATION)
Qty: 8.5 G | Refills: 11 | Status: SHIPPED | OUTPATIENT
Start: 2024-05-22 | End: 2024-06-04 | Stop reason: SDUPTHER

## 2024-06-04 ENCOUNTER — OFFICE VISIT (OUTPATIENT)
Dept: PULMONOLOGY | Facility: CLINIC | Age: 54
End: 2024-06-04
Payer: COMMERCIAL

## 2024-06-04 VITALS
HEIGHT: 59 IN | OXYGEN SATURATION: 97 % | RESPIRATION RATE: 20 BRPM | HEART RATE: 100 BPM | DIASTOLIC BLOOD PRESSURE: 80 MMHG | BODY MASS INDEX: 16.13 KG/M2 | WEIGHT: 80 LBS | SYSTOLIC BLOOD PRESSURE: 106 MMHG

## 2024-06-04 DIAGNOSIS — K21.9 GASTROESOPHAGEAL REFLUX DISEASE WITHOUT ESOPHAGITIS: ICD-10-CM

## 2024-06-04 DIAGNOSIS — J45.30 MILD PERSISTENT ASTHMA WITHOUT COMPLICATION: ICD-10-CM

## 2024-06-04 PROCEDURE — 3074F SYST BP LT 130 MM HG: CPT | Mod: CPTII,S$GLB,, | Performed by: INTERNAL MEDICINE

## 2024-06-04 PROCEDURE — 99999 PR PBB SHADOW E&M-EST. PATIENT-LVL III: CPT | Mod: PBBFAC,,, | Performed by: INTERNAL MEDICINE

## 2024-06-04 PROCEDURE — 3079F DIAST BP 80-89 MM HG: CPT | Mod: CPTII,S$GLB,, | Performed by: INTERNAL MEDICINE

## 2024-06-04 PROCEDURE — 3008F BODY MASS INDEX DOCD: CPT | Mod: CPTII,S$GLB,, | Performed by: INTERNAL MEDICINE

## 2024-06-04 PROCEDURE — 1160F RVW MEDS BY RX/DR IN RCRD: CPT | Mod: CPTII,S$GLB,, | Performed by: INTERNAL MEDICINE

## 2024-06-04 PROCEDURE — 1159F MED LIST DOCD IN RCRD: CPT | Mod: CPTII,S$GLB,, | Performed by: INTERNAL MEDICINE

## 2024-06-04 PROCEDURE — 99213 OFFICE O/P EST LOW 20 MIN: CPT | Mod: S$GLB,,, | Performed by: INTERNAL MEDICINE

## 2024-06-04 RX ORDER — MONTELUKAST SODIUM 10 MG/1
10 TABLET ORAL DAILY
Qty: 90 TABLET | Refills: 3 | Status: SHIPPED | OUTPATIENT
Start: 2024-06-04

## 2024-06-04 RX ORDER — ALBUTEROL SULFATE 90 UG/1
AEROSOL, METERED RESPIRATORY (INHALATION)
Qty: 8.5 G | Refills: 11 | Status: SHIPPED | OUTPATIENT
Start: 2024-06-04

## 2024-06-04 RX ORDER — FLUTICASONE PROPIONATE AND SALMETEROL 250; 50 UG/1; UG/1
1 POWDER RESPIRATORY (INHALATION) 2 TIMES DAILY
Qty: 60 EACH | Refills: 6 | Status: SHIPPED | OUTPATIENT
Start: 2024-06-04

## 2024-06-04 RX ORDER — PANTOPRAZOLE SODIUM 40 MG/1
40 TABLET, DELAYED RELEASE ORAL DAILY
Qty: 90 TABLET | Refills: 3 | Status: SHIPPED | OUTPATIENT
Start: 2024-06-04 | End: 2025-06-04

## 2024-06-04 NOTE — PROGRESS NOTES
"Subjective:      Patient ID: Ernesto Aquino is a 53 y.o. female.    Chief Complaint: Asthma    Asthma  Her past medical history is significant for asthma.       May 2024  53-year-old with longstanding asthma managed with Arnuity once daily and Singulair at bedtime with p.r.n. albuterol. Asthma symptoms of late have been worsening. She was treated for presumed pneumonia/bronchitis in April but continues to have persistent cough and dyspnea in the daytime also worsening cough at night with some reflux symptoms. No fever or chills. No night sweats.     June 2024  Here for follow up of the above  Since last visit has been compliant with medications and feels much better  Nocturnal symptoms have resolved  No further dyspnea or wheezing  Very happy with current treatment regimen    Review of Systems otherwise negative  Objective:     Physical Exam   Constitutional: She is oriented to person, place, and time. She appears well-developed. No distress.   HENT:   Head: Normocephalic.   Cardiovascular: Normal rate and regular rhythm.   Pulmonary/Chest: Normal expansion, effort normal and breath sounds normal. She has no wheezes.   Musculoskeletal:      Cervical back: Neck supple.   Neurological: She is alert and oriented to person, place, and time.   Psychiatric: She has a normal mood and affect.   Nursing note and vitals reviewed.          6/4/2024     2:24 PM 5/8/2024    10:23 AM 4/17/2024     9:35 AM 10/24/2023     8:56 AM 10/17/2023     8:51 AM 10/3/2023     2:09 PM 8/29/2023     8:23 AM   Pulmonary Function Tests   SpO2 97 % 91 % 98 %    96 %   Height 4' 11" (1.499 m) 4' 11" (1.499 m)  4' 11" (1.499 m) 4' 11" (1.499 m) 4' 11" (1.499 m) 4' 11" (1.499 m)   Weight 36.3 kg (80 lb 0.4 oz) 36.1 kg (79 lb 9.4 oz) 36.2 kg (79 lb 12.9 oz) 40.2 kg (88 lb 10 oz) 39.7 kg (87 lb 8.4 oz) 39.7 kg (87 lb 8.4 oz) 40.5 kg (89 lb 4.6 oz)   BMI (Calculated) 16.2 16.1  17.9 17.7 17.7 18        Assessment:     1. Mild persistent asthma without " complication    2. Gastroesophageal reflux disease without esophagitis        Plan:     Continue current treatment regimen  Refilled medications today  Return to clinic 6 months, sooner if needed

## 2024-09-03 ENCOUNTER — OFFICE VISIT (OUTPATIENT)
Dept: FAMILY MEDICINE | Facility: CLINIC | Age: 54
End: 2024-09-03
Attending: FAMILY MEDICINE
Payer: COMMERCIAL

## 2024-09-03 ENCOUNTER — LAB VISIT (OUTPATIENT)
Dept: LAB | Facility: HOSPITAL | Age: 54
End: 2024-09-03
Attending: FAMILY MEDICINE
Payer: COMMERCIAL

## 2024-09-03 VITALS
DIASTOLIC BLOOD PRESSURE: 58 MMHG | OXYGEN SATURATION: 98 % | HEART RATE: 102 BPM | BODY MASS INDEX: 15.65 KG/M2 | SYSTOLIC BLOOD PRESSURE: 104 MMHG | WEIGHT: 77.63 LBS | RESPIRATION RATE: 20 BRPM | TEMPERATURE: 97 F | HEIGHT: 59 IN

## 2024-09-03 DIAGNOSIS — J30.89 NON-SEASONAL ALLERGIC RHINITIS, UNSPECIFIED TRIGGER: ICD-10-CM

## 2024-09-03 DIAGNOSIS — D50.9 IRON DEFICIENCY ANEMIA, UNSPECIFIED IRON DEFICIENCY ANEMIA TYPE: ICD-10-CM

## 2024-09-03 DIAGNOSIS — Z12.11 COLON CANCER SCREENING: ICD-10-CM

## 2024-09-03 DIAGNOSIS — Z00.00 ANNUAL PHYSICAL EXAM: ICD-10-CM

## 2024-09-03 DIAGNOSIS — J45.30 MILD PERSISTENT ASTHMA WITHOUT COMPLICATION: ICD-10-CM

## 2024-09-03 DIAGNOSIS — E05.90 HYPERTHYROIDISM: ICD-10-CM

## 2024-09-03 DIAGNOSIS — Z00.00 ANNUAL PHYSICAL EXAM: Primary | ICD-10-CM

## 2024-09-03 DIAGNOSIS — Z12.31 OTHER SCREENING MAMMOGRAM: ICD-10-CM

## 2024-09-03 DIAGNOSIS — Z23 NEED FOR PROPHYLACTIC VACCINATION AGAINST STREPTOCOCCUS PNEUMONIAE (PNEUMOCOCCUS): ICD-10-CM

## 2024-09-03 DIAGNOSIS — R87.612 LGSIL ON PAP SMEAR OF CERVIX: ICD-10-CM

## 2024-09-03 LAB
ALBUMIN SERPL BCP-MCNC: 3.9 G/DL (ref 3.5–5.2)
ALP SERPL-CCNC: 95 U/L (ref 55–135)
ALT SERPL W/O P-5'-P-CCNC: 20 U/L (ref 10–44)
ANION GAP SERPL CALC-SCNC: 10 MMOL/L (ref 8–16)
AST SERPL-CCNC: 19 U/L (ref 10–40)
BASOPHILS # BLD AUTO: 0.04 K/UL (ref 0–0.2)
BASOPHILS NFR BLD: 0.7 % (ref 0–1.9)
BILIRUB SERPL-MCNC: 0.4 MG/DL (ref 0.1–1)
BUN SERPL-MCNC: 16 MG/DL (ref 6–20)
CALCIUM SERPL-MCNC: 9.7 MG/DL (ref 8.7–10.5)
CHLORIDE SERPL-SCNC: 104 MMOL/L (ref 95–110)
CHOLEST SERPL-MCNC: 196 MG/DL (ref 120–199)
CHOLEST/HDLC SERPL: 2.5 {RATIO} (ref 2–5)
CO2 SERPL-SCNC: 25 MMOL/L (ref 23–29)
CREAT SERPL-MCNC: 0.6 MG/DL (ref 0.5–1.4)
DIFFERENTIAL METHOD BLD: ABNORMAL
EOSINOPHIL # BLD AUTO: 0.1 K/UL (ref 0–0.5)
EOSINOPHIL NFR BLD: 1.3 % (ref 0–8)
ERYTHROCYTE [DISTWIDTH] IN BLOOD BY AUTOMATED COUNT: 12.4 % (ref 11.5–14.5)
EST. GFR  (NO RACE VARIABLE): >60 ML/MIN/1.73 M^2
FERRITIN SERPL-MCNC: 250 NG/ML (ref 20–300)
GLUCOSE SERPL-MCNC: 106 MG/DL (ref 70–110)
HCT VFR BLD AUTO: 40 % (ref 37–48.5)
HDLC SERPL-MCNC: 78 MG/DL (ref 40–75)
HDLC SERPL: 39.8 % (ref 20–50)
HGB BLD-MCNC: 12.7 G/DL (ref 12–16)
IMM GRANULOCYTES # BLD AUTO: 0.01 K/UL (ref 0–0.04)
IMM GRANULOCYTES NFR BLD AUTO: 0.2 % (ref 0–0.5)
LDLC SERPL CALC-MCNC: 107.4 MG/DL (ref 63–159)
LYMPHOCYTES # BLD AUTO: 1.6 K/UL (ref 1–4.8)
LYMPHOCYTES NFR BLD: 25.9 % (ref 18–48)
MCH RBC QN AUTO: 27.9 PG (ref 27–31)
MCHC RBC AUTO-ENTMCNC: 31.8 G/DL (ref 32–36)
MCV RBC AUTO: 88 FL (ref 82–98)
MONOCYTES # BLD AUTO: 0.5 K/UL (ref 0.3–1)
MONOCYTES NFR BLD: 9 % (ref 4–15)
NEUTROPHILS # BLD AUTO: 3.8 K/UL (ref 1.8–7.7)
NEUTROPHILS NFR BLD: 62.9 % (ref 38–73)
NONHDLC SERPL-MCNC: 118 MG/DL
NRBC BLD-RTO: 0 /100 WBC
PLATELET # BLD AUTO: 236 K/UL (ref 150–450)
PMV BLD AUTO: 11.3 FL (ref 9.2–12.9)
POTASSIUM SERPL-SCNC: 3.9 MMOL/L (ref 3.5–5.1)
PROT SERPL-MCNC: 8.2 G/DL (ref 6–8.4)
RBC # BLD AUTO: 4.55 M/UL (ref 4–5.4)
SODIUM SERPL-SCNC: 139 MMOL/L (ref 136–145)
T4 FREE SERPL-MCNC: 1.69 NG/DL (ref 0.71–1.51)
TRIGL SERPL-MCNC: 53 MG/DL (ref 30–150)
TSH SERPL DL<=0.005 MIU/L-ACNC: <0.01 UIU/ML (ref 0.4–4)
WBC # BLD AUTO: 5.99 K/UL (ref 3.9–12.7)

## 2024-09-03 PROCEDURE — 85025 COMPLETE CBC W/AUTO DIFF WBC: CPT | Performed by: FAMILY MEDICINE

## 2024-09-03 PROCEDURE — 3074F SYST BP LT 130 MM HG: CPT | Mod: CPTII,S$GLB,, | Performed by: FAMILY MEDICINE

## 2024-09-03 PROCEDURE — 99999 PR PBB SHADOW E&M-EST. PATIENT-LVL V: CPT | Mod: PBBFAC,,, | Performed by: FAMILY MEDICINE

## 2024-09-03 PROCEDURE — 84439 ASSAY OF FREE THYROXINE: CPT | Performed by: FAMILY MEDICINE

## 2024-09-03 PROCEDURE — 80061 LIPID PANEL: CPT | Performed by: FAMILY MEDICINE

## 2024-09-03 PROCEDURE — 36415 COLL VENOUS BLD VENIPUNCTURE: CPT | Mod: PO | Performed by: FAMILY MEDICINE

## 2024-09-03 PROCEDURE — 80053 COMPREHEN METABOLIC PANEL: CPT | Performed by: FAMILY MEDICINE

## 2024-09-03 PROCEDURE — 3078F DIAST BP <80 MM HG: CPT | Mod: CPTII,S$GLB,, | Performed by: FAMILY MEDICINE

## 2024-09-03 PROCEDURE — 82728 ASSAY OF FERRITIN: CPT | Performed by: FAMILY MEDICINE

## 2024-09-03 PROCEDURE — 1159F MED LIST DOCD IN RCRD: CPT | Mod: CPTII,S$GLB,, | Performed by: FAMILY MEDICINE

## 2024-09-03 PROCEDURE — 99396 PREV VISIT EST AGE 40-64: CPT | Mod: S$GLB,,, | Performed by: FAMILY MEDICINE

## 2024-09-03 PROCEDURE — 1160F RVW MEDS BY RX/DR IN RCRD: CPT | Mod: CPTII,S$GLB,, | Performed by: FAMILY MEDICINE

## 2024-09-03 PROCEDURE — 84443 ASSAY THYROID STIM HORMONE: CPT | Performed by: FAMILY MEDICINE

## 2024-09-03 PROCEDURE — 3008F BODY MASS INDEX DOCD: CPT | Mod: CPTII,S$GLB,, | Performed by: FAMILY MEDICINE

## 2024-09-03 NOTE — PROGRESS NOTES
HISTORY OF PRESENT ILLNESS: Ms. Aquino comes in today without problems for annual wellness exam. She states she is fasting and has taken medication today.     END OF DECISION: She does not have a living will and is not sure about life support.     Current Outpatient Medications   Medication Sig    albuterol (PROAIR HFA) 90 mcg/actuation inhaler INHALE 2 PUFFS INTO THE LUNGS EVERY 6 HOURS AS NEEDED FOR WHEEZING    fluticasone-salmeterol diskus inhaler 250-50 mcg Inhale 1 puff into the lungs 2 (two) times daily. Controller    loratadine (CLARITIN) 10 mg tablet Take 10 mg by mouth once daily.    montelukast (SINGULAIR) 10 mg tablet Take 1 tablet (10 mg total) by mouth once daily.    pantoprazole (PROTONIX) 40 MG tablet Take 1 tablet (40 mg total) by mouth once daily.      SCREENINGS:   Cholesterol: August 29, 2023.   FFS/Colonoscopy: Never. Declines colonoscopy despite my discussion with her regarding importance as patient has family history of colon cancer. September 20, 2023 FIT-KIT - okay.   Mammogram: October 17, 2023 - okay.   GYN Exam: August 29, 2023 pap smear - LGSIL.  October 24, 2023 colposcopy with GYN NP Shwetha Ceballos - abnormal with repeat pap smear in 1 year.  Dexa Scan: Never.  Eye Exam: August 31, 2021 with Dr. Sahu. She wears glasses.  PPD: Negative in the past per patient.  Immunizations: Tdap - July 12, 2012. She desires.  Gardasil - N./A.  Zostavax - N./A.  Pneumovax - Never. She declines.  Prevnar - Never. She declines.  Seasonal Flu - N./A. She declines.  Shingrix - Never. Reports no history of varicella or zoster.   Covid-19 vaccine - Never. She declines.    ROS:  GENERAL: Denies fever, chills, fatigue except reports unusual weight change. Appetite normal. Weight 40.5 kg (89 lb 4.6 oz) at August 29, 2023 visit. Exercises/walks daily at work and monitors diet.  SKIN: Denies rashes, itching, changes in mole, color or texture of skin or easy bruising.  HEAD: Denies headaches or recent head  "trauma.  EYES: Denies change in vision, pain, diplopia, redness or discharge.   EARS: Denies ear pain, discharge, vertigo or decreased hearing.  NOSE: Denies loss of smell, epistaxis or rhinitis.  MOUTH & THROAT: Denies hoarseness or change in voice. Denies excessive gum bleeding or mouth sores. Denies sore throat.  NODES: Denies swollen glands.  CHEST: Denies JAIMES, wheezing, or sputum production. Saw Dr. Kyree Vela, pulmonologist, on June 4, 2024 for mild persistent asthma without complication, gastroesophageal reflux disease with 6-month follow up on December 3, 2024.  CARDIOVASCULAR: Denies chest pain, PND, orthopnea or reduced exercise tolerance. Denies palpitations.  ABDOMEN: Denies diarrhea, constipation, nausea, vomiting, abdominal pain, or blood in stool.  URINARY: Denies flank pain, dysuria or hematuria.  GENITOURINARY: Denies flank pain, dysuria, frequency or hematuria. No change in menses. Occasionally performs monthly breast self exams.   ENDOCRINE: Denies diabetes, cholesterol problems. 10/12/2023 NM thyroid scan showed hyperthyroidism but patient not evaluated by endocrinology as recommended.  HEME/LYMPH: Denies bleeding problems.   PERIPHERAL VASCULAR:Denies claudication or cyanosis.  MUSCULOSKELETAL: Denies joint stiffness, pain or swelling. Denies edema.  NEUROLOGIC: Denies history of seizures, tremors, paralysis, alteration of gait or coordination.  PSYCHIATRIC: Denies mood swings, depression, anxiety, homicidal or suicidal thoughts. Denies sleep problems.     PE:   VS: BP (!) 104/58   Pulse 102   Temp 97.1 °F (36.2 °C) (Tympanic)   Resp 20   Ht 4' 11" (1.499 m)   Wt 35.2 kg (77 lb 9.6 oz)   LMP 09/21/2022 (Exact Date)   SpO2 98%   BMI 15.67 kg/m²   APPEARANCE: Well nourished, well developed female, pleasant, alert and oriented in no acute distress.   HEAD: Non tender. Full range of motion.  EYES: PERRL, conjunctiva pink, lids no edema.  EARS: External canals patent except slight wax " noted. No swelling or redness. TM's partially visualized due to wax and shiny and clear.   NOSE: Mucosa and turbinates pink, not swollen. No discharge. Non tender sinuses.  THROAT: No pharyngeal erythema or exudate. No stridor.   NECK: Supple, no mass, thyroid not enlarged.  NODES: No cervical, axillary lymph node enlargement.  CHEST: Normal respiratory effort. Lungs clear to auscultation.  CARDIOVASCULAR: Normal S1, S2. No rubs, murmurs or gallops. PMI not displaced. No carotid bruit. Pedal pulses palpable bilaterally. No edema.  ABDOMEN: Bowel sounds present. Not distended. Soft. No tenderness, masses or organomegaly.  BREAST EXAM: Symmetrical, no external lesions, no discharge, no masses palpated.  PELVIC EXAM: Not examined as deferred to GYN.   RECTAL EXAM: Not examined as deferred to GYN.   MUSCULOSKELETAL: No joint deformities or stiffness. She is ambulatory without problems.  SKIN: No rashes or suspicious lesions, normal color and turgor.  NEUROLOGIC: Cranial Nerves: II-XII grossly intact. DTR's: Knees, Ankles 2+ and equal bilaterally. Gait & Posture: Normal gait and fine motion.  PSYCHIATRIC: Patient alert, oriented x 3. Mood/Affect normal without acute anxiety or depression noted. Judgment/insight good as she makes appropriate decisions on today's examination.     ASSESSMENT:    ICD-10-CM ICD-9-CM    1. Annual physical exam  Z00.00 V70.0 Lipid Panel      Comprehensive Metabolic Panel      CBC Auto Differential      TSH      Ferritin      2. Hyperthyroidism  E05.90 242.90 Ambulatory referral/consult to Endocrinology      3. Mild persistent asthma without complication  J45.30 493.90       4. Non-seasonal allergic rhinitis, unspecified trigger  J30.89 477.8       5. LGSIL on Pap smear of cervix  R87.612 795.03 Ambulatory referral/consult to Gynecology      6. Iron deficiency anemia, unspecified iron deficiency anemia type  D50.9 280.9       7. Other screening mammogram  Z12.31 V76.12 Mammo Digital Screening  [facility] w/ [facility]      8. Colon cancer screening  Z12.11 V76.51 Fecal Immunochemical Test (iFOBT)      9. Need for prophylactic vaccination against Streptococcus pneumoniae (pneumococcus)  Z23 V03.82 pneumoc 20-silvano conj-dip cr(PF) (PREVNAR-20 (PF)) injection Syrg 0.5 mL (when available)        PLAN:  1. Age-appropriate counseling-appropriate low-sodium, low-cholesterol, low carbohydrate diet and exercise daily, monthly breast self exam, annual wellness examination.   2. Patient advised to call for results.  3. Continue current medications.  4. Keep follow up with specialists.  5. Flu shot this fall.  6. Follow up in about 52 weeks (around 9/2/2025) for physical.

## 2024-09-16 ENCOUNTER — TELEPHONE (OUTPATIENT)
Dept: FAMILY MEDICINE | Facility: CLINIC | Age: 54
End: 2024-09-16
Payer: COMMERCIAL

## 2024-09-16 DIAGNOSIS — E05.90 HYPERTHYROIDISM: Primary | ICD-10-CM

## 2024-09-16 NOTE — TELEPHONE ENCOUNTER
Advise pt lab results are within acceptable range except again her thyroid levels are abnormal.  She needs to see/consult with endocrinology soon if possible.  Please assist patient with appointment.  Thanks for call.    I have put the following orders and/or medications to this note.  Please advise pt and assist.    Orders Placed This Encounter   Procedures    Ambulatory referral/consult to Endocrinology     Standing Status:   Future     Standing Expiration Date:   10/16/2025     Referral Priority:   Routine     Referral Type:   Consultation     Requested Specialty:   Endocrinology     Number of Visits Requested:   1

## 2024-09-16 NOTE — TELEPHONE ENCOUNTER
----- Message from Chelsey Rai sent at 9/16/2024 12:30 PM CDT -----  Contact: Pt  [phone]  Pt called in regards to her results for her tyroid test she has some questions please call and advise

## 2024-10-08 ENCOUNTER — LAB VISIT (OUTPATIENT)
Dept: LAB | Facility: HOSPITAL | Age: 54
End: 2024-10-08
Attending: FAMILY MEDICINE
Payer: COMMERCIAL

## 2024-10-08 DIAGNOSIS — Z12.11 COLON CANCER SCREENING: ICD-10-CM

## 2024-10-08 LAB — HEMOCCULT STL QL IA: NEGATIVE

## 2024-10-08 PROCEDURE — 82274 ASSAY TEST FOR BLOOD FECAL: CPT | Performed by: FAMILY MEDICINE

## 2024-10-22 ENCOUNTER — OFFICE VISIT (OUTPATIENT)
Dept: OBSTETRICS AND GYNECOLOGY | Facility: CLINIC | Age: 54
End: 2024-10-22
Attending: FAMILY MEDICINE
Payer: COMMERCIAL

## 2024-10-22 ENCOUNTER — HOSPITAL ENCOUNTER (OUTPATIENT)
Dept: RADIOLOGY | Facility: HOSPITAL | Age: 54
Discharge: HOME | End: 2024-10-22
Attending: FAMILY MEDICINE
Payer: COMMERCIAL

## 2024-10-22 VITALS
DIASTOLIC BLOOD PRESSURE: 64 MMHG | WEIGHT: 78.25 LBS | SYSTOLIC BLOOD PRESSURE: 98 MMHG | HEIGHT: 59 IN | BODY MASS INDEX: 15.77 KG/M2

## 2024-10-22 VITALS — BODY MASS INDEX: 15.65 KG/M2 | HEIGHT: 59 IN | WEIGHT: 77.63 LBS

## 2024-10-22 DIAGNOSIS — Z12.31 OTHER SCREENING MAMMOGRAM: ICD-10-CM

## 2024-10-22 DIAGNOSIS — Z01.419 ROUTINE GYNECOLOGICAL EXAMINATION: Primary | ICD-10-CM

## 2024-10-22 DIAGNOSIS — R87.612 LGSIL ON PAP SMEAR OF CERVIX: ICD-10-CM

## 2024-10-22 PROCEDURE — 99396 PREV VISIT EST AGE 40-64: CPT | Mod: S$GLB,,, | Performed by: NURSE PRACTITIONER

## 2024-10-22 PROCEDURE — 1160F RVW MEDS BY RX/DR IN RCRD: CPT | Mod: CPTII,S$GLB,, | Performed by: NURSE PRACTITIONER

## 2024-10-22 PROCEDURE — 3008F BODY MASS INDEX DOCD: CPT | Mod: CPTII,S$GLB,, | Performed by: NURSE PRACTITIONER

## 2024-10-22 PROCEDURE — 77067 SCR MAMMO BI INCL CAD: CPT | Mod: 26,,, | Performed by: STUDENT IN AN ORGANIZED HEALTH CARE EDUCATION/TRAINING PROGRAM

## 2024-10-22 PROCEDURE — 99999 PR PBB SHADOW E&M-EST. PATIENT-LVL III: CPT | Mod: PBBFAC,,, | Performed by: NURSE PRACTITIONER

## 2024-10-22 PROCEDURE — 77067 SCR MAMMO BI INCL CAD: CPT | Mod: TC

## 2024-10-22 PROCEDURE — 3078F DIAST BP <80 MM HG: CPT | Mod: CPTII,S$GLB,, | Performed by: NURSE PRACTITIONER

## 2024-10-22 PROCEDURE — 77063 BREAST TOMOSYNTHESIS BI: CPT | Mod: TC

## 2024-10-22 PROCEDURE — 77063 BREAST TOMOSYNTHESIS BI: CPT | Mod: 26,,, | Performed by: STUDENT IN AN ORGANIZED HEALTH CARE EDUCATION/TRAINING PROGRAM

## 2024-10-22 PROCEDURE — 3074F SYST BP LT 130 MM HG: CPT | Mod: CPTII,S$GLB,, | Performed by: NURSE PRACTITIONER

## 2024-10-22 PROCEDURE — 1159F MED LIST DOCD IN RCRD: CPT | Mod: CPTII,S$GLB,, | Performed by: NURSE PRACTITIONER

## 2024-10-22 PROCEDURE — 87624 HPV HI-RISK TYP POOLED RSLT: CPT | Performed by: NURSE PRACTITIONER

## 2024-10-22 NOTE — PROGRESS NOTES
"  Subjective:       Patient ID: Enresto Aquino is a 54 y.o. female.    Chief Complaint:  Annual   .  History of Present Illness  HPI  Colposcopy results were mild abnormal cells   Repeat pap smear in one year 10/2024  Presents today for repeat pap     Mammogram ordered by PCP   No pelvic or vaginal complaints   Health Maintenance   Topic Date Due    Mammogram  10/17/2024    Shingles Vaccine (1 of 2) 2025 (Originally 2022)    Colorectal Cancer Screening  10/08/2025    Lipid Panel  2029    TETANUS VACCINE  2033    Hepatitis C Screening  Completed     GYN & OB History  Patient's last menstrual period was 2022 (exact date).   Date of Last Pap: 10/2024 colpo neg ECC neg    OB History    Para Term  AB Living   0 0 0         SAB IAB Ectopic Multiple Live Births                   Review of Systems  Review of Systems        Objective:   BP 98/64   Ht 4' 11" (1.499 m)   Wt 35.5 kg (78 lb 4.2 oz)   LMP 2022 (Exact Date)   BMI 15.81 kg/m²    Physical Exam:               Genitourinary:    Inguinal canal normal.      Pelvic exam was performed with patient in the lithotomy position.   The external female genitalia was normal.   Genitalia hair distrobution normal .     Labial bartholins normal.Cervix is normal. Vagina exhibits no lesion. No vaginal discharge, tenderness, bleeding, rectocele, cystocele or prolapse of vaginal walls in the vagina.    No foreign body in the vagina.      No signs of injury in the vagina.      pap smear completed                     Assessment:        1. Routine gynecological examination    2. LGSIL on Pap smear of cervix                Plan:            Ernesto Santos" was seen today for repeat pap.    Diagnoses and all orders for this visit:    Routine gynecological examination    LGSIL on Pap smear of cervix  -     Ambulatory referral/consult to Gynecology  -     Liquid-Based Pap Smear, Screening  -     HPV High Risk Genotypes, PCR    Return to clinic " in one year for MANFRED

## 2024-10-23 ENCOUNTER — TELEPHONE (OUTPATIENT)
Dept: RADIOLOGY | Facility: HOSPITAL | Age: 54
End: 2024-10-23
Payer: COMMERCIAL

## 2024-10-29 ENCOUNTER — TELEPHONE (OUTPATIENT)
Dept: RADIOLOGY | Facility: HOSPITAL | Age: 54
End: 2024-10-29

## 2024-10-29 ENCOUNTER — HOSPITAL ENCOUNTER (OUTPATIENT)
Dept: RADIOLOGY | Facility: HOSPITAL | Age: 54
Discharge: HOME | End: 2024-10-29
Attending: FAMILY MEDICINE
Payer: COMMERCIAL

## 2024-10-29 DIAGNOSIS — R92.8 ABNORMAL MAMMOGRAM: ICD-10-CM

## 2024-10-29 DIAGNOSIS — R92.8 ABNORMAL MAMMOGRAM: Primary | ICD-10-CM

## 2024-10-29 PROCEDURE — 77065 DX MAMMO INCL CAD UNI: CPT | Mod: TC,LT

## 2024-10-29 PROCEDURE — 77061 BREAST TOMOSYNTHESIS UNI: CPT | Mod: 26,LT,, | Performed by: RADIOLOGY

## 2024-10-29 PROCEDURE — 77065 DX MAMMO INCL CAD UNI: CPT | Mod: 26,LT,, | Performed by: RADIOLOGY

## 2024-10-29 PROCEDURE — 77061 BREAST TOMOSYNTHESIS UNI: CPT | Mod: TC,LT

## 2024-11-06 ENCOUNTER — HOSPITAL ENCOUNTER (OUTPATIENT)
Dept: RADIOLOGY | Facility: HOSPITAL | Age: 54
Discharge: HOME | End: 2024-11-06
Attending: FAMILY MEDICINE
Payer: COMMERCIAL

## 2024-11-06 DIAGNOSIS — R92.8 ABNORMAL MAMMOGRAM: ICD-10-CM

## 2024-11-06 PROCEDURE — 19081 BX BREAST 1ST LESION STRTCTC: CPT | Mod: LT,,, | Performed by: RADIOLOGY

## 2024-11-06 PROCEDURE — 19081 BX BREAST 1ST LESION STRTCTC: CPT | Mod: LT

## 2024-11-06 NOTE — LETTER
[facility] [address]  Radiology  [address] [facility] [place] [address]  Phone: [phone]  Fax: [phone]           Patient: Ernesto Aquino   YOB: 1970   Today's Date: November 6, 2024       To Whom It May Concern:    This notice verifies that Ernesto Aquino was seen on 11/6/2024.         Sincerely,    Cris Boston, RT

## 2024-11-07 ENCOUNTER — OFFICE VISIT (OUTPATIENT)
Facility: CLINIC | Age: 54
End: 2024-11-07
Payer: COMMERCIAL

## 2024-11-07 DIAGNOSIS — R00.0 TACHYCARDIA: ICD-10-CM

## 2024-11-07 DIAGNOSIS — J45.30 MILD PERSISTENT ASTHMA WITHOUT COMPLICATION: Primary | Chronic | ICD-10-CM

## 2024-11-07 DIAGNOSIS — E05.90 HYPERTHYROIDISM: ICD-10-CM

## 2024-11-07 PROCEDURE — 99204 OFFICE O/P NEW MOD 45 MIN: CPT | Mod: 95,,, | Performed by: STUDENT IN AN ORGANIZED HEALTH CARE EDUCATION/TRAINING PROGRAM

## 2024-11-07 PROCEDURE — G2211 COMPLEX E/M VISIT ADD ON: HCPCS | Mod: 95,,, | Performed by: STUDENT IN AN ORGANIZED HEALTH CARE EDUCATION/TRAINING PROGRAM

## 2024-11-07 RX ORDER — ATENOLOL 25 MG/1
25 TABLET ORAL DAILY
Qty: 30 TABLET | Refills: 11 | Status: SHIPPED | OUTPATIENT
Start: 2024-11-07 | End: 2025-11-07

## 2024-11-07 NOTE — PATIENT INSTRUCTIONS
Thank you for visiting with me during our virtual appointment.      As reviewed I am concerned that you most likely have Grave's disease as the cause for your hyperthyroidism.  The imaging from last year had this appearance in the neck/thyroid region and your labs are consistent with this as well.  I would like to update the labs once more before we start one of the treatments and also add on an antibody level for you to check on the Grave's disease markers and activity.      For now our staff will be reaching out soon to get those labs set up with you but I will now start a medicine to help with the fast heart rate and some of the anxiety as well.  That medicine is called Atenolol which helps to slow the heart rate some.  The goal is to have a resting heart rate less than 90 with this medicine.  You heart rate on checking prior office visits has been around 100.  This pill will be one dose a day for now and once you pick it up you can start it.  You may see some improvement within those first couple days of taking it.    The other pill that we will be using once we have the lab results back is called methimazole.  Methimazole is a medicine that actively slows down how fast the thyroid is at making thyroid hormone.  When I have the labs to review I will then be able to determine which dose of methimazole to send to your pharmacy.      I am including some other general information below on hyperthyroidism and grave's disease.  We can plan a visit again in 6 months but for now will be making adjustments and checking labs over that time period.  Reach out if you have any questions.        Understanding Graves' Disease    What is Graves' Disease?  Graves' disease is an autoimmune disorder that causes your thyroid gland to produce too much thyroid hormone, leading to hyperthyroidism. The thyroid is a small gland in your neck that controls your metabolism--the process your body uses to turn food into energy. When your  thyroid makes too much hormone, it speeds up your body's systems, causing various symptoms.    Symptoms of Hyperthyroidism (Overactive Thyroid)  When your thyroid is overactive, you might experience:    Rapid Heartbeat: Your heart may beat faster than normal or feel like it's pounding.  Nervousness or Anxiety: You might feel unusually nervous, anxious, or irritable.  Weight Loss: Despite eating normally, you may lose weight without trying.  Increased Sweating: You may sweat more than usual.  Tremors: You might notice shaking in your hands or fingers.  Heat Intolerance: Feeling too hot when others are comfortable.  Fatigue: You may feel tired all the time, despite getting enough sleep.  Changes in Menstrual Cycle: For women, periods may become lighter or less frequent.    How Does Graves' Disease Happen?  Graves' disease occurs when your immune system mistakenly attacks your thyroid gland. This attack causes the thyroid to overproduce hormones, leading to hyperthyroidism. The immune system produces a specific antibody called the thyrotropin receptor antibody (TRAb), which stimulates the thyroid to make more hormones than your body needs.    How is Graves' Disease Diagnosed?    Blood Tests:  Thyroid Hormone Levels: These will be higher than normal.  Thyroid-Stimulating Hormone (TSH): This will be lower than normal.  Thyrotropin Receptor Antibody (TRAb): This antibody is usually present in Graves' disease and confirms the diagnosis.  Imaging:  Thyroid Uptake Scan: This imaging test measures how much iodine your thyroid absorbs. A high uptake of iodine usually indicates Graves' disease.      Treatment Options for Graves' Disease    Medications:  Methimazole: This is the most commonly used medication to reduce thyroid hormone production. Some people may use Propylthiouracil (PTU), especially during pregnancy.  Beta Blockers: These medications help manage symptoms like rapid heart rate, anxiety, and tremors but dont  treat the thyroid problem itself.  Radioactive Iodine (KING) Therapy:  KING is taken by mouth and gradually destroys the overactive thyroid cells, reducing hormone production. This treatment is often used if medication doesnt work or if Graves' disease comes back.  Surgery:  Removing part or all of the thyroid gland (thyroidectomy) is another option if other treatments fail or arent suitable. After surgery, you'll likely need to take thyroid hormone replacement for life.      What Are the Chances of Remission?  Graves' disease can go into remission, especially if it's caught early and treated appropriately. With medication, about 30-50% of people may go into long-term remission, especially those with mild disease.      Why is it Important to Treat Graves' Disease?  If left untreated, Graves' disease can lead to serious complications, including:  Heart Problems: Such as rapid heart rate, heart failure, or atrial fibrillation (an irregular heartbeat).  Bone Problems: Increased risk of osteoporosis (weak bones).  Eye Problems: Graves' ophthalmopathy can cause bulging eyes, vision problems, and discomfort.      Lifestyle Considerations:  Avoid Smoking: Smoking can make Graves' disease and its complications, especially eye problems, worse.    Conclusion:  Graves' disease is a manageable condition with proper treatment and lifestyle choices. Early diagnosis and treatment are crucial to avoid complications and achieve remission. Work closely with your healthcare provider to find the best treatment plan for you.

## 2024-11-07 NOTE — ASSESSMENT & PLAN NOTE
Reviewed prior vital signs and heart rate levels from office visits in the prior year.  Fairly persistent tachycardia which is felt to be due to her hyperthyroidism.  Plans to immediately start Atenolol 25 mg daily for now to help with those symptoms.  Goal will be a resting heart rate less than 90 beats a minute.  If needed we can increase to 50 mg in the coming weeks.  Will likely maintain beta blocker for at least a month while awaiting for future addition of methimazole to take effect.

## 2024-11-07 NOTE — PROGRESS NOTES
"ENDOCRINOLOGY NEW PATIENT VISIT: 11/07/2024    The patient location is: Home  The chief complaint leading to consultation is: Hyperthyroidism    Visit type: audiovisual    Face to Face time with patient: 16 minutes  37 minutes of total time spent on the encounter, which includes face to face time and non-face to face time preparing to see the patient (eg, review of tests), Obtaining and/or reviewing separately obtained history, Documenting clinical information in the electronic or other health record, Independently interpreting results (not separately reported) and communicating results to the patient/family/caregiver, or Care coordination (not separately reported).     Each patient to whom he or she provides medical services by telemedicine is:  (1) informed of the relationship between the physician and patient and the respective role of any other health care provider with respect to management of the patient; and (2) notified that he or she may decline to receive medical services by telemedicine and may withdraw from such care at any time.      Subjective:      Patient ID: Ernesto Aquino is a 54 y.o. female.    Chief Complaint:  Hyperthyroidism    History of Present Illness  "Ricco Aquino presents for evaluation and management of hyperthyroidism.  She has had abnormal thyroid function testing since 2023 but was unable to be seen by endocrine until now.  She has had a number of symptoms including weight loss and hair falling out.  Most recent labs again showed hyperthyroidism in September prompting referral.  Prior uptake scan of the thyroid noted from 2023 as well as seen below.  She has some family history of thyroid issues but uncertain if hyperthyroid or other conditions.        Regarding Hyperthyroidism:    - Duration of hyperthyroidism:  Since August 2023  - Etiology determined to be: Likely Grave's  - Current relevant medications:  None  - Takes thyroid medication properly: NA     Latest Reference Range & " Units 09/23/14 08:24 02/11/15 09:27 02/23/16 10:35 02/14/17 10:16 02/27/18 08:54 08/26/21 10:58 08/23/22 10:45 02/14/23 12:13 08/29/23 10:00 09/03/24 09:15   TSH 0.400 - 4.000 uIU/mL 1.513  1.357 1.264 1.013 1.470 1.100 2.059 (E) <0.010 (L) <0.010 (L)   T4, Free 0.70 - 1.48 NG/DL        1.08 (E)     Free T4 0.71 - 1.51 ng/dL         1.59 (H) 1.69 (H)   Thyroglobulin Ab Screen 0.0 - 3.9 IU/mL  <4.0           Thyroperoxidase Antibodies <6.0 IU/mL  <6.0           (L): Data is abnormally low  (H): Data is abnormally high  (E): External lab result    - Most recent thyroid imaging:  As below      - NM Thyroid Uptake: 10/12/2023    FINDINGS:  The 6 hr uptake is elevated at 70.3 % (normal 5-15%).   The 24 hour uptake is elevated at 62.5 % (normal 10-30%).  The left lobe of the thyroid is normal in size, shape, and location with homogenous distribution of the radionuclide throughout the lobe.  The right lobe is of normal size, shape, and location.  Uptake within the normal thyroid tissue is of similar intensity to that of the salivary glands.     Impression:  1. Elevated radioiodine uptake by the thyroid consistent with hyperthyroidism.  2. Normal scintigraphic appearance of the thyroid.      - Most recent DEXA:     - Current symptoms:   No   Yes  [x]    []   Heat intolerance/sweating  []    [x]   Palpitations  []    [x]   Shortness of breath (also has asthma)  [x]    []   Neck swelling/pain/pressure or hoarseness  []    [x]   Weight loss  [x]    []   Tremor  []    [x]   Anxiety/Nervousness  [x]    []   Fatigue (is hyperactive)  [x]    []   Diarrhea  [x]    []   Constipation  [x]    []   Eye bulging/redness/pain/swelling  [x]    []   Muscle weakness  []    [x]   Hair thinning  [x]    []   Pretibial or localized myxedema    - Pertinent factors:  No   Yes  [x]    []   Exogenous thyroid medication  [x]    []   Recent iodinated contrast   [x]    []   Biotin use   [x]    []   Amiodarone or Lithium use  [x]    []   Chemotherapy  "  [x]    []   Prior neck radiation   [x]    []   Severe illness in past 3-6 months   []    [x]   Infertility/abnormal menstruation (post-menopausal at age 52)  [x]    []   Osteoporosis (hyperthyroid patients, with the exception of those with hypercalcemia, should ingest 1200 to 1500 mg elemental calcium daily through diet or supplements)    - Personal or Family History:   No   Yes  []    [x]   Thyroid Disorder (2 nieces and 1 sister with thyroid problems)  [x]    []   Thyroid Cancer       ROS:   As above    Objective:     LMP 09/21/2022 (Exact Date)   BP Readings from Last 3 Encounters:   10/22/24 98/64   09/03/24 (!) 104/58   06/04/24 106/80     Wt Readings from Last 1 Encounters:   10/22/24 0813 35.2 kg (77 lb 9.6 oz)     There is no height or weight on file to calculate BMI.    Wt: 77 lb  Ht: 4'11"  BMI: 15.6    Physical Exam  Constitutional:       Appearance: Normal appearance.   Neurological:      Mental Status: She is alert.   Psychiatric:         Mood and Affect: Mood normal.         Behavior: Behavior normal.        Lab Review:   No results found for: "HGBA1C"  Lab Results   Component Value Date    CHOL 196 09/03/2024    HDL 78 (H) 09/03/2024    LDLCALC 107.4 09/03/2024    TRIG 53 09/03/2024    CHOLHDL 39.8 09/03/2024     Lab Results   Component Value Date     09/03/2024    K 3.9 09/03/2024     09/03/2024    CO2 25 09/03/2024     09/03/2024    BUN 16 09/03/2024    CREATININE 0.6 09/03/2024    CALCIUM 9.7 09/03/2024    PROT 8.2 09/03/2024    ALBUMIN 3.9 09/03/2024    BILITOT 0.4 09/03/2024    ALKPHOS 95 09/03/2024    AST 19 09/03/2024    ALT 20 09/03/2024    ANIONGAP 10 09/03/2024    ESTGFRAFRICA >60.0 08/26/2021    EGFRNONAA >60.0 08/26/2021    TSH <0.010 (L) 09/03/2024     No results found for: "[id number]"    Assessment and Plan     Hyperthyroidism  Reviewed labs and imaging.  Persistent hyperthyroidism with suppressed TSH and elevated T4 levels since August 2023.  Thyroid uptake scan " from 2023 also showed pattern of increased uptake.  No intervention pursued at that time it seems.      Symptoms of hyperthyroidism ongoing with weight loss, hair loss, palpitations and anxiety.  Family history of some thyroid disorders.  Etiology is suspected to be Grave's disease.  Prior TG Ab and TPO Ab normal.      Plan:  - Start Atenolol 25 mg daily  - Check TSI and TRAb now  - Update TSH, Free T4 and now T3 level    When labs return we will likely start methimazole once daily of 5 to 10 mg or more pending degree of hyperthyroidism.  Will continue Atenolol for symptoms management for the next month or two as well.  Reviewed need for frequent lab monitoring after medication addition and dose adjustments.  Goal will be normalizing thyroid function to help resolve symptoms as well in the process.      Asthma  Some issues with shortness of breath which could also be partly related to hyperthyroidism but she can continue on current therapies for asthma as we treat the hyperthyroidism as planned.      Tachycardia  Reviewed prior vital signs and heart rate levels from office visits in the prior year.  Fairly persistent tachycardia which is felt to be due to her hyperthyroidism.  Plans to immediately start Atenolol 25 mg daily for now to help with those symptoms.  Goal will be a resting heart rate less than 90 beats a minute.  If needed we can increase to 50 mg in the coming weeks.  Will likely maintain beta blocker for at least a month while awaiting for future addition of methimazole to take effect.        RTC in 6 months, labs now.       **Visit today included increased complexity associated with the care of the problems addressed and managing the longitudinal care of the patient due to the serious and/or complex managed problems      Oneil Rubio, DO     Disclaimer: This note has been generated using voice-recognition software. There may be typographical errors that have been missed during proof-reading.

## 2024-11-07 NOTE — Clinical Note
Please contact this patient and arrange labs soon for her in [place].  Follow up in 6 months  Thanks

## 2024-11-07 NOTE — ASSESSMENT & PLAN NOTE
Some issues with shortness of breath which could also be partly related to hyperthyroidism but she can continue on current therapies for asthma as we treat the hyperthyroidism as planned.

## 2024-11-07 NOTE — ASSESSMENT & PLAN NOTE
Reviewed labs and imaging.  Persistent hyperthyroidism with suppressed TSH and elevated T4 levels since August 2023.  Thyroid uptake scan from 2023 also showed pattern of increased uptake.  No intervention pursued at that time it seems.      Symptoms of hyperthyroidism ongoing with weight loss, hair loss, palpitations and anxiety.  Family history of some thyroid disorders.  Etiology is suspected to be Grave's disease.  Prior TG Ab and TPO Ab normal.      Plan:  - Start Atenolol 25 mg daily  - Check TSI and TRAb now  - Update TSH, Free T4 and now T3 level    When labs return we will likely start methimazole once daily of 5 to 10 mg or more pending degree of hyperthyroidism.  Will continue Atenolol for symptoms management for the next month or two as well.  Reviewed need for frequent lab monitoring after medication addition and dose adjustments.  Goal will be normalizing thyroid function to help resolve symptoms as well in the process.

## 2024-11-08 ENCOUNTER — LAB VISIT (OUTPATIENT)
Dept: LAB | Facility: HOSPITAL | Age: 54
End: 2024-11-08
Attending: STUDENT IN AN ORGANIZED HEALTH CARE EDUCATION/TRAINING PROGRAM
Payer: COMMERCIAL

## 2024-11-08 DIAGNOSIS — E05.90 HYPERTHYROIDISM: ICD-10-CM

## 2024-11-08 LAB
T3 SERPL-MCNC: 138 NG/DL (ref 60–180)
T4 FREE SERPL-MCNC: 1.3 NG/DL (ref 0.71–1.51)
TSH SERPL DL<=0.005 MIU/L-ACNC: <0.01 UIU/ML (ref 0.4–4)

## 2024-11-08 PROCEDURE — 84445 ASSAY OF TSI GLOBULIN: CPT | Performed by: STUDENT IN AN ORGANIZED HEALTH CARE EDUCATION/TRAINING PROGRAM

## 2024-11-08 PROCEDURE — 36415 COLL VENOUS BLD VENIPUNCTURE: CPT | Performed by: STUDENT IN AN ORGANIZED HEALTH CARE EDUCATION/TRAINING PROGRAM

## 2024-11-08 PROCEDURE — 84443 ASSAY THYROID STIM HORMONE: CPT | Performed by: STUDENT IN AN ORGANIZED HEALTH CARE EDUCATION/TRAINING PROGRAM

## 2024-11-08 PROCEDURE — 84439 ASSAY OF FREE THYROXINE: CPT | Performed by: STUDENT IN AN ORGANIZED HEALTH CARE EDUCATION/TRAINING PROGRAM

## 2024-11-08 PROCEDURE — 84480 ASSAY TRIIODOTHYRONINE (T3): CPT | Performed by: STUDENT IN AN ORGANIZED HEALTH CARE EDUCATION/TRAINING PROGRAM

## 2024-11-08 PROCEDURE — 83520 IMMUNOASSAY QUANT NOS NONAB: CPT | Performed by: STUDENT IN AN ORGANIZED HEALTH CARE EDUCATION/TRAINING PROGRAM

## 2024-11-11 ENCOUNTER — PATIENT MESSAGE (OUTPATIENT)
Facility: CLINIC | Age: 54
End: 2024-11-11
Payer: COMMERCIAL

## 2024-11-11 DIAGNOSIS — E05.00 GRAVES DISEASE: Primary | ICD-10-CM

## 2024-11-11 LAB
TSH RECEP AB SER-ACNC: 20 IU/L (ref 0–1.75)
TSI SER-ACNC: 6.68 IU/L

## 2024-11-11 RX ORDER — METHIMAZOLE 5 MG/1
5 TABLET ORAL DAILY
Qty: 30 TABLET | Refills: 11 | Status: SHIPPED | OUTPATIENT
Start: 2024-11-11 | End: 2024-11-12 | Stop reason: SDUPTHER

## 2024-11-12 ENCOUNTER — TELEPHONE (OUTPATIENT)
Dept: SURGICAL ONCOLOGY | Facility: CLINIC | Age: 54
End: 2024-11-12
Payer: COMMERCIAL

## 2024-11-12 ENCOUNTER — PATIENT MESSAGE (OUTPATIENT)
Dept: ENDOCRINOLOGY | Facility: CLINIC | Age: 54
End: 2024-11-12
Payer: COMMERCIAL

## 2024-11-12 DIAGNOSIS — E05.00 GRAVES DISEASE: ICD-10-CM

## 2024-11-12 RX ORDER — METHIMAZOLE 5 MG/1
5 TABLET ORAL DAILY
Qty: 30 TABLET | Refills: 11 | Status: SHIPPED | OUTPATIENT
Start: 2024-11-12 | End: 2025-11-12

## 2024-12-03 ENCOUNTER — OFFICE VISIT (OUTPATIENT)
Dept: PULMONOLOGY | Facility: CLINIC | Age: 54
End: 2024-12-03
Payer: COMMERCIAL

## 2024-12-03 VITALS
HEIGHT: 59 IN | BODY MASS INDEX: 16.53 KG/M2 | RESPIRATION RATE: 17 BRPM | WEIGHT: 82 LBS | SYSTOLIC BLOOD PRESSURE: 120 MMHG | DIASTOLIC BLOOD PRESSURE: 80 MMHG | HEART RATE: 80 BPM | OXYGEN SATURATION: 96 %

## 2024-12-03 DIAGNOSIS — J45.30 MILD PERSISTENT ASTHMA WITHOUT COMPLICATION: ICD-10-CM

## 2024-12-03 DIAGNOSIS — K21.9 GASTROESOPHAGEAL REFLUX DISEASE WITHOUT ESOPHAGITIS: ICD-10-CM

## 2024-12-03 PROCEDURE — 1160F RVW MEDS BY RX/DR IN RCRD: CPT | Mod: CPTII,S$GLB,, | Performed by: INTERNAL MEDICINE

## 2024-12-03 PROCEDURE — 3008F BODY MASS INDEX DOCD: CPT | Mod: CPTII,S$GLB,, | Performed by: INTERNAL MEDICINE

## 2024-12-03 PROCEDURE — 3074F SYST BP LT 130 MM HG: CPT | Mod: CPTII,S$GLB,, | Performed by: INTERNAL MEDICINE

## 2024-12-03 PROCEDURE — 99999 PR PBB SHADOW E&M-EST. PATIENT-LVL III: CPT | Mod: PBBFAC,,, | Performed by: INTERNAL MEDICINE

## 2024-12-03 PROCEDURE — 1159F MED LIST DOCD IN RCRD: CPT | Mod: CPTII,S$GLB,, | Performed by: INTERNAL MEDICINE

## 2024-12-03 PROCEDURE — 99213 OFFICE O/P EST LOW 20 MIN: CPT | Mod: S$GLB,,, | Performed by: INTERNAL MEDICINE

## 2024-12-03 PROCEDURE — 3079F DIAST BP 80-89 MM HG: CPT | Mod: CPTII,S$GLB,, | Performed by: INTERNAL MEDICINE

## 2024-12-03 RX ORDER — PANTOPRAZOLE SODIUM 40 MG/1
40 TABLET, DELAYED RELEASE ORAL DAILY
Qty: 90 TABLET | Refills: 3 | Status: SHIPPED | OUTPATIENT
Start: 2024-12-03 | End: 2025-12-03

## 2024-12-03 RX ORDER — ALBUTEROL SULFATE 90 UG/1
INHALANT RESPIRATORY (INHALATION)
Qty: 18 G | Refills: 11 | Status: SHIPPED | OUTPATIENT
Start: 2024-12-03

## 2024-12-03 RX ORDER — FLUTICASONE PROPIONATE AND SALMETEROL 250; 50 UG/1; UG/1
1 POWDER RESPIRATORY (INHALATION) 2 TIMES DAILY
Qty: 60 EACH | Refills: 11 | Status: SHIPPED | OUTPATIENT
Start: 2024-12-03

## 2024-12-03 RX ORDER — MONTELUKAST SODIUM 10 MG/1
10 TABLET ORAL DAILY
Qty: 90 TABLET | Refills: 3 | Status: SHIPPED | OUTPATIENT
Start: 2024-12-03

## 2024-12-03 NOTE — PROGRESS NOTES
"Subjective:      Patient ID: Ernesto Aquino is a 54 y.o. female.    Chief Complaint: Asthma    Asthma  Her past medical history is significant for asthma.       May 2024  53-year-old with longstanding asthma managed with Arnuity once daily and Singulair at bedtime with p.r.n. albuterol. Asthma symptoms of late have been worsening. She was treated for presumed pneumonia/bronchitis in April but continues to have persistent cough and dyspnea in the daytime also worsening cough at night with some reflux symptoms. No fever or chills. No night sweats.      June 2024  Here for follow up of the above  Since last visit has been compliant with medications and feels much better  Nocturnal symptoms have resolved  No further dyspnea or wheezing  Very happy with current treatment regimen    November 2024  Here for follow up with the above  Doing well from a respiratory standpoint  No rescue inhaler use in the interim  Does have hyperthyroidism that is being treated, followed by Endocrinology    Review of Systems as per history of present illness otherwise negative  Objective:     Physical Exam   Constitutional: She is oriented to person, place, and time. She appears well-developed. No distress.   HENT:   Head: Normocephalic.   Cardiovascular: Normal rate and regular rhythm.   Pulmonary/Chest: Normal expansion, symmetric chest wall expansion, effort normal and breath sounds normal.   Musculoskeletal:      Cervical back: Neck supple.   Neurological: She is alert and oriented to person, place, and time.   Psychiatric: She has a normal mood and affect.   Nursing note and vitals reviewed.          12/3/2024     1:33 PM 10/22/2024    10:19 AM 10/22/2024     8:13 AM 9/3/2024     8:13 AM 6/4/2024     2:24 PM 5/8/2024    10:23 AM 4/17/2024     9:35 AM   Pulmonary Function Tests   SpO2 96 %   98 % 97 % 91 % 98 %   Height 4' 11" (1.499 m) 4' 11" (1.499 m) 4' 11" (1.499 m) 4' 11" (1.499 m) 4' 11" (1.499 m) 4' 11" (1.499 m)    Weight 37.2 kg " (82 lb 0.2 oz) 35.5 kg (78 lb 4.2 oz) 35.2 kg (77 lb 9.6 oz) 35.2 kg (77 lb 9.6 oz) 36.3 kg (80 lb 0.4 oz) 36.1 kg (79 lb 9.4 oz) 36.2 kg (79 lb 12.9 oz)   BMI (Calculated) 16.6 15.8 15.7 15.7 16.2 16.1         Assessment:     1. Mild persistent asthma without complication    2. Gastroesophageal reflux disease without esophagitis      Plan:     Asthma well controlled on current regimen, continue current medications  Continue to follow up with primary care and Endocrinology  Sent refills for current medications to pharmacy  Return in 1 year, sooner if needed

## 2024-12-10 ENCOUNTER — LAB VISIT (OUTPATIENT)
Dept: LAB | Facility: HOSPITAL | Age: 54
End: 2024-12-10
Attending: STUDENT IN AN ORGANIZED HEALTH CARE EDUCATION/TRAINING PROGRAM
Payer: COMMERCIAL

## 2024-12-10 DIAGNOSIS — E05.00 GRAVES DISEASE: ICD-10-CM

## 2024-12-10 LAB
T4 FREE SERPL-MCNC: 0.88 NG/DL (ref 0.71–1.51)
TSH SERPL DL<=0.005 MIU/L-ACNC: <0.01 UIU/ML (ref 0.4–4)

## 2024-12-10 PROCEDURE — 84439 ASSAY OF FREE THYROXINE: CPT | Performed by: STUDENT IN AN ORGANIZED HEALTH CARE EDUCATION/TRAINING PROGRAM

## 2024-12-10 PROCEDURE — 84443 ASSAY THYROID STIM HORMONE: CPT | Performed by: STUDENT IN AN ORGANIZED HEALTH CARE EDUCATION/TRAINING PROGRAM

## 2024-12-10 PROCEDURE — 36415 COLL VENOUS BLD VENIPUNCTURE: CPT | Performed by: STUDENT IN AN ORGANIZED HEALTH CARE EDUCATION/TRAINING PROGRAM

## 2024-12-11 ENCOUNTER — PATIENT MESSAGE (OUTPATIENT)
Facility: CLINIC | Age: 54
End: 2024-12-11
Payer: COMMERCIAL

## 2024-12-11 ENCOUNTER — TELEPHONE (OUTPATIENT)
Dept: ENDOCRINOLOGY | Facility: CLINIC | Age: 54
End: 2024-12-11
Payer: COMMERCIAL

## 2024-12-11 DIAGNOSIS — E05.90 HYPERTHYROIDISM: Primary | ICD-10-CM

## 2025-01-10 DIAGNOSIS — J45.30 MILD PERSISTENT ASTHMA WITHOUT COMPLICATION: ICD-10-CM

## 2025-01-11 RX ORDER — FLUTICASONE PROPIONATE AND SALMETEROL 250; 50 UG/1; UG/1
1 POWDER RESPIRATORY (INHALATION) 2 TIMES DAILY
Qty: 60 EACH | Refills: 11 | Status: SHIPPED | OUTPATIENT
Start: 2025-01-11

## 2025-01-28 ENCOUNTER — LAB VISIT (OUTPATIENT)
Dept: LAB | Facility: HOSPITAL | Age: 55
End: 2025-01-28
Attending: STUDENT IN AN ORGANIZED HEALTH CARE EDUCATION/TRAINING PROGRAM
Payer: COMMERCIAL

## 2025-01-28 DIAGNOSIS — E05.90 HYPERTHYROIDISM: ICD-10-CM

## 2025-01-28 LAB
T4 FREE SERPL-MCNC: 0.67 NG/DL (ref 0.71–1.51)
TSH SERPL DL<=0.005 MIU/L-ACNC: 4.58 UIU/ML (ref 0.4–4)

## 2025-01-28 PROCEDURE — 84439 ASSAY OF FREE THYROXINE: CPT | Performed by: STUDENT IN AN ORGANIZED HEALTH CARE EDUCATION/TRAINING PROGRAM

## 2025-01-28 PROCEDURE — 84443 ASSAY THYROID STIM HORMONE: CPT | Performed by: STUDENT IN AN ORGANIZED HEALTH CARE EDUCATION/TRAINING PROGRAM

## 2025-01-28 PROCEDURE — 36415 COLL VENOUS BLD VENIPUNCTURE: CPT | Performed by: STUDENT IN AN ORGANIZED HEALTH CARE EDUCATION/TRAINING PROGRAM

## 2025-01-29 ENCOUNTER — PATIENT MESSAGE (OUTPATIENT)
Facility: CLINIC | Age: 55
End: 2025-01-29
Payer: COMMERCIAL

## 2025-01-30 ENCOUNTER — TELEPHONE (OUTPATIENT)
Dept: ENDOCRINOLOGY | Facility: CLINIC | Age: 55
End: 2025-01-30
Payer: COMMERCIAL

## 2025-01-30 DIAGNOSIS — E05.00 GRAVES DISEASE: ICD-10-CM

## 2025-01-30 DIAGNOSIS — E05.90 HYPERTHYROIDISM: Primary | ICD-10-CM

## 2025-01-30 NOTE — TELEPHONE ENCOUNTER
Plans to repeat labs in 6 weeks.  She will skip methimazole dose 2 days a week.  5 tabs a week for now.

## 2025-03-06 ENCOUNTER — PATIENT MESSAGE (OUTPATIENT)
Facility: CLINIC | Age: 55
End: 2025-03-06
Payer: COMMERCIAL

## 2025-03-06 ENCOUNTER — TELEPHONE (OUTPATIENT)
Dept: ENDOCRINOLOGY | Facility: CLINIC | Age: 55
End: 2025-03-06
Payer: COMMERCIAL

## 2025-03-06 NOTE — TELEPHONE ENCOUNTER
----- Message from [place] sent at 3/6/2025  9:06 AM CST -----  Contact: JOSEPHINE TADEO [[id number]]  ..Type:  Patient Requesting CallWho Called:JOSEPHINE TADEO [[id number]]Would the patient rather a call back or a response via [facility]? CallBe Call Back Number:.[phone] (home) Additional Information: Patient called to discuss a rash with itchiness.

## 2025-03-18 ENCOUNTER — LAB VISIT (OUTPATIENT)
Dept: LAB | Facility: HOSPITAL | Age: 55
End: 2025-03-18
Attending: STUDENT IN AN ORGANIZED HEALTH CARE EDUCATION/TRAINING PROGRAM
Payer: COMMERCIAL

## 2025-03-18 DIAGNOSIS — E05.00 GRAVES DISEASE: ICD-10-CM

## 2025-03-18 DIAGNOSIS — E05.90 HYPERTHYROIDISM: ICD-10-CM

## 2025-03-18 LAB
T4 FREE SERPL-MCNC: 0.87 NG/DL (ref 0.71–1.51)
TSH SERPL DL<=0.005 MIU/L-ACNC: 1.62 UIU/ML (ref 0.4–4)

## 2025-03-18 PROCEDURE — 84443 ASSAY THYROID STIM HORMONE: CPT | Performed by: STUDENT IN AN ORGANIZED HEALTH CARE EDUCATION/TRAINING PROGRAM

## 2025-03-18 PROCEDURE — 84439 ASSAY OF FREE THYROXINE: CPT | Performed by: STUDENT IN AN ORGANIZED HEALTH CARE EDUCATION/TRAINING PROGRAM

## 2025-03-18 PROCEDURE — 36415 COLL VENOUS BLD VENIPUNCTURE: CPT | Performed by: STUDENT IN AN ORGANIZED HEALTH CARE EDUCATION/TRAINING PROGRAM

## 2025-03-23 ENCOUNTER — RESULTS FOLLOW-UP (OUTPATIENT)
Dept: ENDOCRINOLOGY | Facility: CLINIC | Age: 55
End: 2025-03-23

## 2025-03-23 DIAGNOSIS — E05.00 GRAVES DISEASE: Primary | ICD-10-CM

## 2025-05-06 ENCOUNTER — HOSPITAL ENCOUNTER (OUTPATIENT)
Dept: RADIOLOGY | Facility: HOSPITAL | Age: 55
Discharge: HOME | End: 2025-05-06
Attending: FAMILY MEDICINE
Payer: COMMERCIAL

## 2025-05-06 DIAGNOSIS — R92.8 ABNORMAL MAMMOGRAM: ICD-10-CM

## 2025-05-06 PROCEDURE — 77061 BREAST TOMOSYNTHESIS UNI: CPT | Mod: 26,LT,, | Performed by: STUDENT IN AN ORGANIZED HEALTH CARE EDUCATION/TRAINING PROGRAM

## 2025-05-06 PROCEDURE — 77065 DX MAMMO INCL CAD UNI: CPT | Mod: TC,LT

## 2025-05-06 PROCEDURE — 77065 DX MAMMO INCL CAD UNI: CPT | Mod: 26,LT,, | Performed by: STUDENT IN AN ORGANIZED HEALTH CARE EDUCATION/TRAINING PROGRAM

## 2025-05-27 ENCOUNTER — LAB VISIT (OUTPATIENT)
Dept: LAB | Facility: HOSPITAL | Age: 55
End: 2025-05-27
Attending: STUDENT IN AN ORGANIZED HEALTH CARE EDUCATION/TRAINING PROGRAM
Payer: COMMERCIAL

## 2025-05-27 DIAGNOSIS — E05.00 GRAVES DISEASE: ICD-10-CM

## 2025-05-27 LAB
T4 FREE SERPL-MCNC: 1.05 NG/DL (ref 0.71–1.51)
TSH SERPL-ACNC: 2.39 UIU/ML (ref 0.4–4)

## 2025-05-27 PROCEDURE — 36415 COLL VENOUS BLD VENIPUNCTURE: CPT

## 2025-05-27 PROCEDURE — 84443 ASSAY THYROID STIM HORMONE: CPT

## 2025-05-27 PROCEDURE — 84439 ASSAY OF FREE THYROXINE: CPT

## 2025-05-28 ENCOUNTER — RESULTS FOLLOW-UP (OUTPATIENT)
Dept: ENDOCRINOLOGY | Facility: CLINIC | Age: 55
End: 2025-05-28

## 2025-05-28 DIAGNOSIS — E05.00 GRAVES DISEASE: Primary | ICD-10-CM

## 2025-05-28 DIAGNOSIS — E05.90 HYPERTHYROIDISM: ICD-10-CM
